# Patient Record
Sex: MALE | Race: WHITE | Employment: FULL TIME | URBAN - METROPOLITAN AREA
[De-identification: names, ages, dates, MRNs, and addresses within clinical notes are randomized per-mention and may not be internally consistent; named-entity substitution may affect disease eponyms.]

---

## 2017-07-26 ENCOUNTER — OFFICE VISIT (OUTPATIENT)
Dept: FAMILY MEDICINE CLINIC | Age: 32
End: 2017-07-26

## 2017-07-26 VITALS
BODY MASS INDEX: 46.13 KG/M2 | OXYGEN SATURATION: 97 % | HEART RATE: 97 BPM | SYSTOLIC BLOOD PRESSURE: 148 MMHG | WEIGHT: 315 LBS | TEMPERATURE: 98.6 F | DIASTOLIC BLOOD PRESSURE: 90 MMHG

## 2017-07-26 DIAGNOSIS — K21.9 GASTROESOPHAGEAL REFLUX DISEASE WITHOUT ESOPHAGITIS: ICD-10-CM

## 2017-07-26 DIAGNOSIS — R53.83 FATIGUE, UNSPECIFIED TYPE: ICD-10-CM

## 2017-07-26 DIAGNOSIS — Z13.6 SCREENING FOR CARDIOVASCULAR CONDITION: ICD-10-CM

## 2017-07-26 DIAGNOSIS — Z00.00 PHYSICAL EXAM, ANNUAL: Primary | ICD-10-CM

## 2017-07-26 DIAGNOSIS — E73.9 LACTOSE INTOLERANCE IN ADULT: ICD-10-CM

## 2017-07-26 DIAGNOSIS — G44.89 OTHER HEADACHE SYNDROME: ICD-10-CM

## 2017-07-26 DIAGNOSIS — J45.20 MILD INTERMITTENT ASTHMA WITHOUT COMPLICATION: ICD-10-CM

## 2017-07-26 DIAGNOSIS — E66.01 MORBID OBESITY DUE TO EXCESS CALORIES (HCC): ICD-10-CM

## 2017-07-26 PROCEDURE — 99395 PREV VISIT EST AGE 18-39: CPT | Performed by: FAMILY MEDICINE

## 2017-07-26 PROCEDURE — 36415 COLL VENOUS BLD VENIPUNCTURE: CPT | Performed by: FAMILY MEDICINE

## 2017-07-26 RX ORDER — ALBUTEROL SULFATE 90 UG/1
2 AEROSOL, METERED RESPIRATORY (INHALATION) EVERY 4 HOURS PRN
Qty: 1 INHALER | Refills: 5 | Status: SHIPPED | OUTPATIENT
Start: 2017-07-26 | End: 2017-12-21 | Stop reason: SDUPTHER

## 2017-07-26 RX ORDER — OMEPRAZOLE 40 MG/1
40 CAPSULE, DELAYED RELEASE ORAL DAILY
Qty: 30 CAPSULE | Refills: 5 | Status: SHIPPED | OUTPATIENT
Start: 2017-07-26 | End: 2018-01-03 | Stop reason: SDUPTHER

## 2017-07-27 LAB
A/G RATIO: 1.5 (CALC) (ref 0.8–2.6)
ALBUMIN SERPL-MCNC: 4.3 GM/DL (ref 3.5–5.2)
ALP BLD-CCNC: 81 U/L (ref 23–144)
ALT SERPL-CCNC: 66 U/L (ref 0–60)
AST SERPL-CCNC: 60 U/L (ref 0–46)
BASOPHILS ABSOLUTE: 0 K/MM3 (ref 0–0.3)
BASOPHILS RELATIVE PERCENT: 0.5 % (ref 0–2)
BILIRUB SERPL-MCNC: 0.3 MG/DL (ref 0–1.2)
BUN / CREAT RATIO: 13 (CALC) (ref 7–25)
BUN BLDV-MCNC: 13 MG/DL (ref 3–29)
CALCIUM SERPL-MCNC: 9.2 MG/DL (ref 8.5–10.5)
CHLORIDE BLD-SCNC: 101 MEQ/L (ref 96–110)
CHOLESTEROL, TOTAL: 160 MG/DL
CO2: 29 MEQ/L (ref 19–32)
COPY(IES) SENT TO:: NORMAL
CREAT SERPL-MCNC: 1 MG/DL
EOSINOPHILS ABSOLUTE: 0.2 K/MM3 (ref 0–0.6)
EOSINOPHILS RELATIVE PERCENT: 2 % (ref 0–7)
GFR SERPL CREATININE-BSD FRML MDRD: 101 ML/MIN/1.73M2
GLOBULIN: 2.9 GM/DL (CALC) (ref 1.9–3.6)
GLUCOSE BLD-MCNC: 113 MG/DL
HCT VFR BLD CALC: 46 % (ref 41–50)
HDLC SERPL-MCNC: 26 MG/DL
HEMOGLOBIN: 15 G/DL (ref 13.8–17.2)
LDL CHOLESTEROL: 60 MG/DL (CALC)
LEUKOCYTES, UA: 8.9 K/MM3 (ref 3.8–10.8)
LYMPHOCYTES ABSOLUTE: 2.7 K/MM3 (ref 0.9–4.1)
LYMPHOCYTES RELATIVE PERCENT: 30.7 % (ref 18–47)
MCH RBC QN AUTO: 30.8 PG (ref 27–33)
MCHC RBC AUTO-ENTMCNC: 32.7 G/DL (ref 32–36)
MCV RBC AUTO: 94.5 FL (ref 80–100)
MONOCYTES ABSOLUTE: 0.8 K/MM3 (ref 0.2–1.1)
MONOCYTES RELATIVE PERCENT: 9.4 % (ref 0–14)
NEUTROPHILS ABSOLUTE: 5.1 K/MM3 (ref 1.5–7.8)
PDW BLD-RTO: 13.6 % (ref 9–15)
PLATELET # BLD: 177 K/MM3 (ref 130–400)
POTASSIUM SERPL-SCNC: 4.4 MEQ/L (ref 3.4–5.3)
RBC # BLD: 4.87 M/MM3 (ref 4.4–5.8)
SEGMENTED NEUTROPHILS RELATIVE PERCENT: 57.4 % (ref 40–75)
SODIUM BLD-SCNC: 144 MEQ/L (ref 135–148)
TOTAL PROTEIN: 7.2 GM/DL (ref 6–8.3)
TRIGL SERPL-MCNC: 369 MG/DL
TSH SERPL DL<=0.05 MIU/L-ACNC: 4.14 MICRO IU/ML (ref 0.4–4)
VLDLC SERPL CALC-MCNC: 74 MG/DL (CALC) (ref 4–28)

## 2017-08-09 DIAGNOSIS — E03.9 ACQUIRED HYPOTHYROIDISM: Primary | ICD-10-CM

## 2017-08-09 RX ORDER — LEVOTHYROXINE SODIUM 0.05 MG/1
50 TABLET ORAL DAILY
Qty: 30 TABLET | Refills: 1 | Status: SHIPPED | OUTPATIENT
Start: 2017-08-09 | End: 2017-11-21 | Stop reason: SDUPTHER

## 2017-08-14 ENCOUNTER — TELEPHONE (OUTPATIENT)
Dept: FAMILY MEDICINE CLINIC | Age: 32
End: 2017-08-14

## 2017-08-14 ENCOUNTER — NURSE ONLY (OUTPATIENT)
Dept: FAMILY MEDICINE CLINIC | Age: 32
End: 2017-08-14

## 2017-08-14 VITALS — SYSTOLIC BLOOD PRESSURE: 152 MMHG | DIASTOLIC BLOOD PRESSURE: 94 MMHG

## 2017-08-14 DIAGNOSIS — I10 ESSENTIAL HYPERTENSION: Primary | ICD-10-CM

## 2017-08-14 RX ORDER — LISINOPRIL 20 MG/1
20 TABLET ORAL DAILY
Qty: 30 TABLET | Refills: 0 | Status: SHIPPED | OUTPATIENT
Start: 2017-08-14 | End: 2017-09-11 | Stop reason: SDUPTHER

## 2017-09-11 ENCOUNTER — OFFICE VISIT (OUTPATIENT)
Dept: FAMILY MEDICINE CLINIC | Age: 32
End: 2017-09-11

## 2017-09-11 VITALS
OXYGEN SATURATION: 96 % | DIASTOLIC BLOOD PRESSURE: 66 MMHG | WEIGHT: 315 LBS | BODY MASS INDEX: 45.4 KG/M2 | SYSTOLIC BLOOD PRESSURE: 118 MMHG | TEMPERATURE: 97.2 F | HEART RATE: 92 BPM

## 2017-09-11 DIAGNOSIS — I10 ESSENTIAL HYPERTENSION: ICD-10-CM

## 2017-09-11 DIAGNOSIS — E03.9 ACQUIRED HYPOTHYROIDISM: ICD-10-CM

## 2017-09-11 PROCEDURE — 36415 COLL VENOUS BLD VENIPUNCTURE: CPT | Performed by: FAMILY MEDICINE

## 2017-09-11 PROCEDURE — 99214 OFFICE O/P EST MOD 30 MIN: CPT | Performed by: FAMILY MEDICINE

## 2017-09-11 RX ORDER — LISINOPRIL 20 MG/1
20 TABLET ORAL DAILY
Qty: 30 TABLET | Refills: 5 | Status: SHIPPED | OUTPATIENT
Start: 2017-09-11 | End: 2018-02-12 | Stop reason: SDUPTHER

## 2017-09-12 LAB
BUN / CREAT RATIO: 18 (CALC) (ref 7–25)
BUN BLDV-MCNC: 16 MG/DL (ref 3–29)
CALCIUM SERPL-MCNC: 8.9 MG/DL (ref 8.5–10.5)
CHLORIDE BLD-SCNC: 102 MEQ/L (ref 96–110)
CO2: 32 MEQ/L (ref 19–32)
COPY(IES) SENT TO:: NORMAL
CREAT SERPL-MCNC: 0.9 MG/DL
GFR SERPL CREATININE-BSD FRML MDRD: 115 ML/MIN/1.73M2
GLUCOSE BLD-MCNC: 64 MG/DL
POTASSIUM SERPL-SCNC: 4.3 MEQ/L (ref 3.4–5.3)
SODIUM BLD-SCNC: 147 MEQ/L (ref 135–148)
TSH SERPL DL<=0.05 MIU/L-ACNC: 2.92 MICRO IU/ML (ref 0.4–4)

## 2018-02-08 ENCOUNTER — TELEPHONE (OUTPATIENT)
Dept: FAMILY MEDICINE CLINIC | Age: 33
End: 2018-02-08

## 2018-02-09 DIAGNOSIS — E73.9 LACTOSE INTOLERANCE IN ADULT: ICD-10-CM

## 2018-02-09 DIAGNOSIS — K21.9 GASTROESOPHAGEAL REFLUX DISEASE WITHOUT ESOPHAGITIS: ICD-10-CM

## 2018-02-09 RX ORDER — ALBUTEROL SULFATE 90 UG/1
2 AEROSOL, METERED RESPIRATORY (INHALATION) EVERY 4 HOURS PRN
Qty: 18 G | Refills: 0 | Status: SHIPPED | OUTPATIENT
Start: 2018-02-09 | End: 2018-02-12 | Stop reason: SDUPTHER

## 2018-02-09 RX ORDER — OMEPRAZOLE 40 MG/1
40 CAPSULE, DELAYED RELEASE ORAL DAILY
Qty: 30 CAPSULE | Refills: 0 | Status: SHIPPED | OUTPATIENT
Start: 2018-02-09 | End: 2018-02-12 | Stop reason: SDUPTHER

## 2018-02-12 ENCOUNTER — OFFICE VISIT (OUTPATIENT)
Dept: FAMILY MEDICINE CLINIC | Age: 33
End: 2018-02-12

## 2018-02-12 VITALS
TEMPERATURE: 97.2 F | OXYGEN SATURATION: 98 % | WEIGHT: 304 LBS | DIASTOLIC BLOOD PRESSURE: 80 MMHG | HEART RATE: 86 BPM | SYSTOLIC BLOOD PRESSURE: 126 MMHG | HEIGHT: 74 IN | BODY MASS INDEX: 39.01 KG/M2 | RESPIRATION RATE: 14 BRPM

## 2018-02-12 DIAGNOSIS — F41.9 ANXIETY: ICD-10-CM

## 2018-02-12 DIAGNOSIS — K21.9 GASTROESOPHAGEAL REFLUX DISEASE WITHOUT ESOPHAGITIS: ICD-10-CM

## 2018-02-12 DIAGNOSIS — E03.9 ACQUIRED HYPOTHYROIDISM: ICD-10-CM

## 2018-02-12 DIAGNOSIS — J45.40 MODERATE PERSISTENT ASTHMA WITHOUT COMPLICATION: ICD-10-CM

## 2018-02-12 DIAGNOSIS — I10 ESSENTIAL HYPERTENSION: Primary | ICD-10-CM

## 2018-02-12 PROCEDURE — 99214 OFFICE O/P EST MOD 30 MIN: CPT | Performed by: FAMILY MEDICINE

## 2018-02-12 PROCEDURE — G8510 SCR DEP NEG, NO PLAN REQD: HCPCS | Performed by: FAMILY MEDICINE

## 2018-02-12 PROCEDURE — 36415 COLL VENOUS BLD VENIPUNCTURE: CPT | Performed by: FAMILY MEDICINE

## 2018-02-12 RX ORDER — VARENICLINE TARTRATE 25 MG
KIT ORAL
Qty: 1 EACH | Refills: 0 | Status: SHIPPED | OUTPATIENT
Start: 2018-02-12 | End: 2018-07-26 | Stop reason: ALTCHOICE

## 2018-02-12 RX ORDER — OMEPRAZOLE 40 MG/1
40 CAPSULE, DELAYED RELEASE ORAL DAILY
Qty: 90 CAPSULE | Refills: 1 | Status: SHIPPED | OUTPATIENT
Start: 2018-02-12 | End: 2018-07-26 | Stop reason: SDUPTHER

## 2018-02-12 RX ORDER — LEVOTHYROXINE SODIUM 0.05 MG/1
TABLET ORAL
Qty: 90 TABLET | Refills: 1 | Status: SHIPPED | OUTPATIENT
Start: 2018-02-12 | End: 2018-03-12 | Stop reason: SDUPTHER

## 2018-02-12 RX ORDER — ALBUTEROL SULFATE 90 UG/1
2 AEROSOL, METERED RESPIRATORY (INHALATION) EVERY 4 HOURS PRN
Qty: 3 INHALER | Refills: 1 | Status: SHIPPED | OUTPATIENT
Start: 2018-02-12 | End: 2018-07-02 | Stop reason: SDUPTHER

## 2018-02-12 RX ORDER — LISINOPRIL 20 MG/1
20 TABLET ORAL DAILY
Qty: 90 TABLET | Refills: 1 | Status: SHIPPED | OUTPATIENT
Start: 2018-02-12 | End: 2018-07-26 | Stop reason: SDUPTHER

## 2018-02-12 RX ORDER — FLUOXETINE 10 MG/1
10 CAPSULE ORAL DAILY
Qty: 30 CAPSULE | Refills: 0 | Status: SHIPPED | OUTPATIENT
Start: 2018-02-12 | End: 2018-03-12 | Stop reason: SDUPTHER

## 2018-02-12 ASSESSMENT — PATIENT HEALTH QUESTIONNAIRE - PHQ9
SUM OF ALL RESPONSES TO PHQ9 QUESTIONS 1 & 2: 0
SUM OF ALL RESPONSES TO PHQ QUESTIONS 1-9: 0
2. FEELING DOWN, DEPRESSED OR HOPELESS: 0
1. LITTLE INTEREST OR PLEASURE IN DOING THINGS: 0

## 2018-02-12 NOTE — PATIENT INSTRUCTIONS
social groups, or volunteer to help others. Being alone sometimes makes things seem worse than they are. · Get at least 30 minutes of exercise on most days of the week to relieve stress. Walking is a good choice. You also may want to do other activities, such as running, swimming, cycling, or playing tennis or team sports. Relaxation techniques  Do relaxation exercises 10 to 20 minutes a day. You can play soothing, relaxing music while you do them, if you wish. · Tell others in your house that you are going to do your relaxation exercises. Ask them not to disturb you. · Find a comfortable place, away from all distractions and noise. · Lie down on your back, or sit with your back straight. · Focus on your breathing. Make it slow and steady. · Breathe in through your nose. Breathe out through either your nose or mouth. · Breathe deeply, filling up the area between your navel and your rib cage. Breathe so that your belly goes up and down. · Do not hold your breath. · Breathe like this for 5 to 10 minutes. Notice the feeling of calmness throughout your whole body. As you continue to breathe slowly and deeply, relax by doing the following for another 5 to 10 minutes:  · Tighten and relax each muscle group in your body. You can begin at your toes and work your way up to your head. · Imagine your muscle groups relaxing and becoming heavy. · Empty your mind of all thoughts. · Let yourself relax more and more deeply. · Become aware of the state of calmness that surrounds you. · When your relaxation time is over, you can bring yourself back to alertness by moving your fingers and toes and then your hands and feet and then stretching and moving your entire body. Sometimes people fall asleep during relaxation, but they usually wake up shortly afterward. · Always give yourself time to return to full alertness before you drive a car or do anything that might cause an accident if you are not fully alert.  Never play a relaxation tape while you drive a car. When should you call for help? Call 911 anytime you think you may need emergency care. For example, call if:  ? · You feel you cannot stop from hurting yourself or someone else. ? Keep the numbers for these national suicide hotlines: 3-438-893-TALK (4-199.901.1862) and 1-448-CLNKUJC (7-773.685.1377). If you or someone you know talks about suicide or feeling hopeless, get help right away. ? Watch closely for changes in your health, and be sure to contact your doctor if:  ? · You have anxiety or fear that affects your life. ? · You have symptoms of anxiety that are new or different from those you had before. Where can you learn more? Go to https://Horizon Studiospepiceweb.Feebbo. org and sign in to your MyShape account. Enter P754 in the [employer] box to learn more about \"Anxiety Disorder: Care Instructions. \"     If you do not have an account, please click on the \"Sign Up Now\" link. Current as of: May 12, 2017  Content Version: 11.5  © 5612-4484 Healthwise, Incorporated. Care instructions adapted under license by [facility] ([facility]). If you have questions about a medical condition or this instruction, always ask your healthcare professional. [address] any warranty or liability for your use of this information.

## 2018-02-13 LAB
A/G RATIO: 1.5 (CALC) (ref 0.8–2.6)
ALBUMIN SERPL-MCNC: 4.3 GM/DL (ref 3.5–5.2)
ALP BLD-CCNC: 87 U/L (ref 23–144)
ALT SERPL-CCNC: 34 U/L (ref 0–60)
AST SERPL-CCNC: 43 U/L (ref 0–46)
BILIRUB SERPL-MCNC: 0.4 MG/DL (ref 0–1.2)
BUN / CREAT RATIO: 20 (CALC) (ref 7–25)
BUN BLDV-MCNC: 18 MG/DL (ref 3–29)
CALCIUM SERPL-MCNC: 9 MG/DL (ref 8.5–10.5)
CHLORIDE BLD-SCNC: 100 MEQ/L (ref 96–110)
CO2: 28 MEQ/L (ref 19–32)
COPY(IES) SENT TO:: NORMAL
CREAT SERPL-MCNC: 0.9 MG/DL
GFR SERPL CREATININE-BSD FRML MDRD: 114 ML/MIN/1.73M2
GLOBULIN: 2.9 GM/DL (CALC) (ref 1.9–3.6)
GLUCOSE BLD-MCNC: 100 MG/DL
POTASSIUM SERPL-SCNC: 4.1 MEQ/L (ref 3.4–5.3)
SODIUM BLD-SCNC: 142 MEQ/L (ref 135–148)
TOTAL PROTEIN: 7.2 GM/DL (ref 6–8.3)
TSH SERPL DL<=0.05 MIU/L-ACNC: 2.64 MICRO IU/ML (ref 0.4–4)

## 2018-03-12 ENCOUNTER — OFFICE VISIT (OUTPATIENT)
Dept: FAMILY MEDICINE CLINIC | Age: 33
End: 2018-03-12

## 2018-03-12 VITALS
DIASTOLIC BLOOD PRESSURE: 80 MMHG | BODY MASS INDEX: 38.94 KG/M2 | SYSTOLIC BLOOD PRESSURE: 118 MMHG | OXYGEN SATURATION: 96 % | WEIGHT: 303.4 LBS | TEMPERATURE: 97.8 F | HEIGHT: 74 IN | HEART RATE: 86 BPM

## 2018-03-12 DIAGNOSIS — E03.9 ACQUIRED HYPOTHYROIDISM: ICD-10-CM

## 2018-03-12 DIAGNOSIS — F41.9 ANXIETY: ICD-10-CM

## 2018-03-12 PROCEDURE — 99213 OFFICE O/P EST LOW 20 MIN: CPT | Performed by: FAMILY MEDICINE

## 2018-03-12 RX ORDER — LEVOTHYROXINE SODIUM 0.07 MG/1
75 TABLET ORAL DAILY
Qty: 90 TABLET | Refills: 1 | Status: SHIPPED | OUTPATIENT
Start: 2018-03-12 | End: 2018-07-26 | Stop reason: SDUPTHER

## 2018-03-12 RX ORDER — FLUOXETINE HYDROCHLORIDE 20 MG/1
20 CAPSULE ORAL DAILY
Qty: 90 CAPSULE | Refills: 1 | Status: SHIPPED | OUTPATIENT
Start: 2018-03-12 | End: 2018-07-06 | Stop reason: ALTCHOICE

## 2018-03-12 NOTE — PATIENT INSTRUCTIONS
social groups, or volunteer to help others. Being alone sometimes makes things seem worse than they are. · Get at least 30 minutes of exercise on most days of the week to relieve stress. Walking is a good choice. You also may want to do other activities, such as running, swimming, cycling, or playing tennis or team sports. Relaxation techniques  Do relaxation exercises 10 to 20 minutes a day. You can play soothing, relaxing music while you do them, if you wish. · Tell others in your house that you are going to do your relaxation exercises. Ask them not to disturb you. · Find a comfortable place, away from all distractions and noise. · Lie down on your back, or sit with your back straight. · Focus on your breathing. Make it slow and steady. · Breathe in through your nose. Breathe out through either your nose or mouth. · Breathe deeply, filling up the area between your navel and your rib cage. Breathe so that your belly goes up and down. · Do not hold your breath. · Breathe like this for 5 to 10 minutes. Notice the feeling of calmness throughout your whole body. As you continue to breathe slowly and deeply, relax by doing the following for another 5 to 10 minutes:  · Tighten and relax each muscle group in your body. You can begin at your toes and work your way up to your head. · Imagine your muscle groups relaxing and becoming heavy. · Empty your mind of all thoughts. · Let yourself relax more and more deeply. · Become aware of the state of calmness that surrounds you. · When your relaxation time is over, you can bring yourself back to alertness by moving your fingers and toes and then your hands and feet and then stretching and moving your entire body. Sometimes people fall asleep during relaxation, but they usually wake up shortly afterward. · Always give yourself time to return to full alertness before you drive a car or do anything that might cause an accident if you are not fully alert.  Never play a relaxation tape while you drive a car. When should you call for help? Call 911 anytime you think you may need emergency care. For example, call if:  ? · You feel you cannot stop from hurting yourself or someone else. ? Keep the numbers for these national suicide hotlines: 7-652-531-TALK (5-289.290.4717) and 5-125-SPISZRE (3-752.648.5150). If you or someone you know talks about suicide or feeling hopeless, get help right away. ? Watch closely for changes in your health, and be sure to contact your doctor if:  ? · You have anxiety or fear that affects your life. ? · You have symptoms of anxiety that are new or different from those you had before. Where can you learn more? Go to https://OpenROVpejayU-NOTEeb.Spectropath. org and sign in to your PacketFront account. Enter P754 in the [employer] box to learn more about \"Anxiety Disorder: Care Instructions. \"     If you do not have an account, please click on the \"Sign Up Now\" link. Current as of: May 12, 2017  Content Version: 11.5  © 5917-7699 Healthwise, Incorporated. Care instructions adapted under license by [facility] [place] ([facility]). If you have questions about a medical condition or this instruction, always ask your healthcare professional. [address] any warranty or liability for your use of this information.

## 2018-03-12 NOTE — PROGRESS NOTES
Subjective:       Alejandra Abernathy is a 29 y.o. male who presents for follow up of anxiety disorder. Overall, symptoms are improving on fluoxetine 10 mg.  Current symptoms: feelings of losing control, irritable. He denies current suicidal and homicidal ideation. He complains of the following side effects from the treatment: none. Patient's medications, allergies, past medical, surgical, social and family histories were reviewed and updated as appropriate. Objective:      /80 (Site: Left Arm, Position: Sitting, Cuff Size: Large Adult)   Pulse 86   Temp 97.8 °F (36.6 °C) (Oral)   Ht 6' 2\" (1.88 m)   Wt (!) 303 lb 6.4 oz (137.6 kg)   SpO2 96%   BMI 38.95 kg/m²    General:  alert, appears stated age and cooperative. No shake or tremor observed. Neck: Thyroid not palpable, not enlarged, no nodules detected. Chest: clear with no wheezes or rales. No retractions, or use of accessory muscles noted. Cardiovascular: PMI is not displaced, and no thrill noted. Regular rate and rhythm with no rub, murmur or gallop. No peripheral edema. Pedal pulses are normal.    Affect & Behavior:   agitation       Assessment:      Anxiety Disorder - improving      Plan:   Increase Prozac to 20 mg daily   Recommended counseling. Reviewed concept of anxiety as biochemical imbalance of neurotransmitters and rationale for treatment. Instructed patient to contact office or on-call physician promptly should condition worsen or any new symptoms appear and provided on-call telephone numbers. IF THE PATIENT HAS ANY SUICIDAL OR HOMICIDAL IDEATIONS, CALL THE OFFICE, DISCUSS WITH A SUPPORT MEMBER, OR GO TO THE ER IMMEDIATELY. Patient was agreeable with this plan. Follow up: 5 months.

## 2018-03-26 ENCOUNTER — TELEPHONE (OUTPATIENT)
Dept: FAMILY MEDICINE CLINIC | Age: 33
End: 2018-03-26

## 2018-04-11 ENCOUNTER — TELEPHONE (OUTPATIENT)
Dept: FAMILY MEDICINE CLINIC | Age: 33
End: 2018-04-11

## 2018-06-25 ENCOUNTER — HOSPITAL ENCOUNTER (OUTPATIENT)
Dept: GENERAL RADIOLOGY | Age: 33
Discharge: HOME | End: 2018-06-25
Attending: PODIATRIST | Admitting: PODIATRIST

## 2018-06-25 DIAGNOSIS — M79.671 RIGHT FOOT PAIN: ICD-10-CM

## 2018-06-25 DIAGNOSIS — M21.6X2 ACQUIRED CAVOVARUS FOOT DEFORMITY, LEFT: ICD-10-CM

## 2018-06-25 DIAGNOSIS — M21.6X1 ACQUIRED EXTERNAL ROTATION OF FOOT, RIGHT: ICD-10-CM

## 2018-07-02 DIAGNOSIS — J45.40 MODERATE PERSISTENT ASTHMA WITHOUT COMPLICATION: ICD-10-CM

## 2018-07-05 RX ORDER — ALBUTEROL SULFATE 90 UG/1
2 AEROSOL, METERED RESPIRATORY (INHALATION) EVERY 4 HOURS PRN
Qty: 1 INHALER | Refills: 0 | Status: SHIPPED | OUTPATIENT
Start: 2018-07-05 | End: 2018-07-06 | Stop reason: SDUPTHER

## 2018-07-05 NOTE — TELEPHONE ENCOUNTER
Patient needs an office visit to discuss controller medications. He needs better control of his asthma.

## 2018-07-06 ENCOUNTER — OFFICE VISIT (OUTPATIENT)
Dept: FAMILY MEDICINE CLINIC | Age: 33
End: 2018-07-06

## 2018-07-06 VITALS
BODY MASS INDEX: 41.03 KG/M2 | SYSTOLIC BLOOD PRESSURE: 124 MMHG | OXYGEN SATURATION: 97 % | TEMPERATURE: 96.8 F | WEIGHT: 315 LBS | HEART RATE: 63 BPM | DIASTOLIC BLOOD PRESSURE: 82 MMHG

## 2018-07-06 DIAGNOSIS — F41.9 ANXIETY: Primary | ICD-10-CM

## 2018-07-06 DIAGNOSIS — J45.40 MODERATE PERSISTENT ASTHMA WITHOUT COMPLICATION: ICD-10-CM

## 2018-07-06 PROCEDURE — 99214 OFFICE O/P EST MOD 30 MIN: CPT | Performed by: FAMILY MEDICINE

## 2018-07-06 PROCEDURE — G8510 SCR DEP NEG, NO PLAN REQD: HCPCS | Performed by: FAMILY MEDICINE

## 2018-07-06 RX ORDER — FLUVOXAMINE MALEATE 100 MG/1
100 CAPSULE, EXTENDED RELEASE ORAL NIGHTLY
Qty: 30 CAPSULE | Refills: 5 | Status: SHIPPED | OUTPATIENT
Start: 2018-07-06 | End: 2022-05-12 | Stop reason: ALTCHOICE

## 2018-07-06 RX ORDER — ALBUTEROL SULFATE 90 UG/1
2 AEROSOL, METERED RESPIRATORY (INHALATION) EVERY 4 HOURS PRN
Qty: 1 INHALER | Refills: 5 | Status: SHIPPED | OUTPATIENT
Start: 2018-07-06 | End: 2022-05-12 | Stop reason: SDUPTHER

## 2018-07-06 RX ORDER — MELOXICAM 15 MG/1
TABLET ORAL
Refills: 2 | COMMUNITY
Start: 2018-06-07 | End: 2022-05-12 | Stop reason: ALTCHOICE

## 2018-07-06 ASSESSMENT — PATIENT HEALTH QUESTIONNAIRE - PHQ9
2. FEELING DOWN, DEPRESSED OR HOPELESS: 0
SUM OF ALL RESPONSES TO PHQ QUESTIONS 1-9: 0
1. LITTLE INTEREST OR PLEASURE IN DOING THINGS: 0
SUM OF ALL RESPONSES TO PHQ9 QUESTIONS 1 & 2: 0

## 2018-07-06 NOTE — PROGRESS NOTES
Subjective:       Ashley Aguilar is a 29 y.o. male who presents for follow up of anxiety disorder. Overall, symptoms are improving on fluoxetine 10 mg.  Current symptoms: feelings of losing control, irritable. He denies current suicidal and homicidal ideation. He complains of the following side effects from the treatment: makes him feel disconnected. Asthma: Patient presents for asthma follow-up. He is not currently in exacerbation. Symptoms currently include dyspnea and occur more than once daily. Observed precipitants include emotional upset. Current limitations in activity from asthma: none. Number of days of school or work missed in the last month: 0. Frequency of use of quick-relief meds: several times per day. Patient's medications, allergies, past medical, surgical, social and family histories were reviewed and updated as appropriate. Objective:      /82 (Site: Left Arm, Position: Sitting, Cuff Size: Medium Adult)   Pulse 63   Temp 96.8 °F (36 °C) (Temporal)   Wt (!) 319 lb 9.6 oz (145 kg)   SpO2 97%   BMI 41.03 kg/m²    General:  alert, appears stated age and cooperative. No shake or tremor observed. Neck: Thyroid not palpable, not enlarged, no nodules detected. Chest: clear with no wheezes or rales. No retractions, or use of accessory muscles noted. Cardiovascular: PMI is not displaced, and no thrill noted. Regular rate and rhythm with no rub, murmur or gallop. No peripheral edema. Pedal pulses are normal.    Affect & Behavior:   agitation       Assessment:      Anxiety Disorder - unchanged  Asthma - moderate persistant       Plan:   Stop Prozac and start Luvox. Start Asmanex as controller medication. Recommended counseling. Reviewed concept of anxiety as biochemical imbalance of neurotransmitters and rationale for treatment.   Instructed patient to contact office or on-call physician promptly should condition worsen or any new symptoms appear and provided on-call

## 2018-07-26 ENCOUNTER — OFFICE VISIT (OUTPATIENT)
Dept: FAMILY MEDICINE CLINIC | Age: 33
End: 2018-07-26

## 2018-07-26 VITALS
WEIGHT: 310.2 LBS | DIASTOLIC BLOOD PRESSURE: 78 MMHG | SYSTOLIC BLOOD PRESSURE: 120 MMHG | HEART RATE: 76 BPM | OXYGEN SATURATION: 98 % | TEMPERATURE: 97.6 F | BODY MASS INDEX: 39.83 KG/M2

## 2018-07-26 DIAGNOSIS — H60.00 FURUNCLE OF EAR: Primary | ICD-10-CM

## 2018-07-26 DIAGNOSIS — E03.9 ACQUIRED HYPOTHYROIDISM: ICD-10-CM

## 2018-07-26 DIAGNOSIS — K21.9 GASTROESOPHAGEAL REFLUX DISEASE WITHOUT ESOPHAGITIS: ICD-10-CM

## 2018-07-26 DIAGNOSIS — I10 ESSENTIAL HYPERTENSION: ICD-10-CM

## 2018-07-26 LAB
ANION GAP SERPL CALCULATED.3IONS-SCNC: 14 MMOL/L (ref 3–16)
BUN BLDV-MCNC: 14 MG/DL (ref 7–20)
CALCIUM SERPL-MCNC: 9 MG/DL (ref 8.3–10.6)
CHLORIDE BLD-SCNC: 103 MMOL/L (ref 99–110)
CO2: 27 MMOL/L (ref 21–32)
CREAT SERPL-MCNC: 1 MG/DL (ref 0.9–1.3)
GFR AFRICAN AMERICAN: >60
GFR NON-AFRICAN AMERICAN: >60
GLUCOSE BLD-MCNC: 89 MG/DL (ref 70–99)
POTASSIUM SERPL-SCNC: 4.5 MMOL/L (ref 3.5–5.1)
SODIUM BLD-SCNC: 144 MMOL/L (ref 136–145)

## 2018-07-26 PROCEDURE — 36415 COLL VENOUS BLD VENIPUNCTURE: CPT | Performed by: FAMILY MEDICINE

## 2018-07-26 PROCEDURE — 99214 OFFICE O/P EST MOD 30 MIN: CPT | Performed by: FAMILY MEDICINE

## 2018-07-26 RX ORDER — LEVOTHYROXINE SODIUM 0.07 MG/1
75 TABLET ORAL DAILY
Qty: 90 TABLET | Refills: 1 | Status: SHIPPED | OUTPATIENT
Start: 2018-07-26 | End: 2022-05-12 | Stop reason: ALTCHOICE

## 2018-07-26 RX ORDER — LISINOPRIL 20 MG/1
20 TABLET ORAL DAILY
Qty: 90 TABLET | Refills: 1 | Status: SHIPPED | OUTPATIENT
Start: 2018-07-26 | End: 2022-05-13 | Stop reason: SDUPTHER

## 2018-07-26 RX ORDER — OMEPRAZOLE 40 MG/1
40 CAPSULE, DELAYED RELEASE ORAL DAILY
Qty: 90 CAPSULE | Refills: 1 | Status: SHIPPED | OUTPATIENT
Start: 2018-07-26 | End: 2022-05-12 | Stop reason: ALTCHOICE

## 2018-07-26 NOTE — PROGRESS NOTES
Subjective:      Barbie Gann is a 29 y.o. male who presents for evaluation of hypertension. He indicates that he is feeling well and denies any symptoms referable to his elevated blood pressure. Specifically denies chest pain, palpitations, dyspnea, orthopnea, PND or peripheral edema. Current medication regimen is as listed below. Patient denies any side effects of medication. Hypothyroidism: Patient complains of hypothyroidism. Symptoms include none. Patient denies change in energy level, diarrhea, heat / cold intolerance, nervousness, palpitations and weight changes. Onset of symptoms was several years ago. Symptoms have been well-controlled. GERD: Elvin complains of heartburn. This has been associated with no other symptoms. He denies belching and chest pain. Symptoms have been present for several years. He denies dysphagia. He has not lost weight. He denies melena, hematochezia, hematemesis, and coffee ground emesis. Medical therapy in the past has included proton pump inhibitors. Lesion on right outer ear. Had a sore that he \" popped. \"  It was infected, but he treated with neosporin. No fever.     Current Outpatient Prescriptions   Medication Sig Dispense Refill    omeprazole (PRILOSEC) 40 MG delayed release capsule Take 1 capsule by mouth daily 90 capsule 1    lisinopril (PRINIVIL;ZESTRIL) 20 MG tablet Take 1 tablet by mouth daily 90 tablet 1    levothyroxine (SYNTHROID) 75 MCG tablet Take 1 tablet by mouth daily TAKE ONE TABLET BY MOUTH ONE TIME A DAY 90 tablet 1    meloxicam (MOBIC) 15 MG tablet TAKE 1 TABLET BY MOUTH DAILY  2    fluvoxaMINE (LUVOX CR) 100 MG CP24 extended release capsule Take 100 mg by mouth nightly 30 capsule 5    mometasone (ASMANEX) 220 MCG/INH inhaler Inhale 2 puffs into the lungs daily 1 Inhaler 5    albuterol sulfate HFA (VENTOLIN HFA) 108 (90 Base) MCG/ACT inhaler Inhale 2 puffs into the lungs every 4 hours as needed for Wheezing 1 Inhaler 5     No current

## 2021-05-22 ENCOUNTER — HOSPITAL ENCOUNTER (OUTPATIENT)
Dept: SLEEP CENTER | Age: 36
Discharge: HOME | End: 2021-05-22

## 2021-05-22 VITALS — BODY MASS INDEX: 39.17 KG/M2 | HEIGHT: 75 IN | WEIGHT: 315 LBS

## 2021-05-22 DIAGNOSIS — G47.33 OSA (OBSTRUCTIVE SLEEP APNEA): ICD-10-CM

## 2021-05-22 PROCEDURE — 95810 POLYSOM 6/> YRS 4/> PARAM: CPT

## 2021-05-22 ASSESSMENT — SLEEP AND FATIGUE QUESTIONNAIRES
HOW LIKELY ARE YOU TO NOD OFF OR FALL ASLEEP IN A CAR, WHILE STOPPED FOR A FEW MINUTES IN TRAFFIC: 0
HOW LIKELY ARE YOU TO NOD OFF OR FALL ASLEEP WHILE SITTING AND READING: 0
HOW LIKELY ARE YOU TO NOD OFF OR FALL ASLEEP WHILE WATCHING TV: 0
HOW LIKELY ARE YOU TO NOD OFF OR FALL ASLEEP WHILE LYING DOWN TO REST IN THE AFTERNOON WHEN CIRCUMSTANCES PERMIT: 3
HOW LIKELY ARE YOU TO NOD OFF OR FALL ASLEEP WHILE SITTING AND TALKING TO SOMEONE: 0

## 2021-05-23 NOTE — PROGRESS NOTES
5/23/2021  sleep study  for Umm Anguiano  1985 is complete. Results are pending physician review.     Electronically signed by Carlos Enrique Gann on 5/23/2021 at 7:16 AM

## 2021-05-27 LAB — STATUS: NORMAL

## 2021-12-14 ENCOUNTER — HOSPITAL ENCOUNTER (EMERGENCY)
Age: 36
Discharge: LEFT BEFORE DISCHARGE | End: 2021-12-14

## 2021-12-14 NOTE — ED NOTES
RN enters lobby and patient not present.  Per registration, patient left the hospital.     Saintclair Maxon, RN  12/14/21 [id number]

## 2022-05-12 ENCOUNTER — OFFICE VISIT (OUTPATIENT)
Dept: INTERNAL MEDICINE CLINIC | Age: 37
End: 2022-05-12
Payer: COMMERCIAL

## 2022-05-12 VITALS
OXYGEN SATURATION: 95 % | DIASTOLIC BLOOD PRESSURE: 90 MMHG | WEIGHT: 315 LBS | HEART RATE: 90 BPM | BODY MASS INDEX: 43.87 KG/M2 | SYSTOLIC BLOOD PRESSURE: 144 MMHG

## 2022-05-12 DIAGNOSIS — R73.09 ELEVATED GLUCOSE: ICD-10-CM

## 2022-05-12 DIAGNOSIS — Z00.00 GENERAL MEDICAL EXAM: ICD-10-CM

## 2022-05-12 DIAGNOSIS — I10 ESSENTIAL HYPERTENSION: Primary | ICD-10-CM

## 2022-05-12 DIAGNOSIS — E78.5 HYPERLIPIDEMIA, UNSPECIFIED HYPERLIPIDEMIA TYPE: ICD-10-CM

## 2022-05-12 DIAGNOSIS — E66.01 CLASS 3 SEVERE OBESITY DUE TO EXCESS CALORIES WITHOUT SERIOUS COMORBIDITY WITH BODY MASS INDEX (BMI) OF 40.0 TO 44.9 IN ADULT (HCC): ICD-10-CM

## 2022-05-12 DIAGNOSIS — E03.9 ACQUIRED HYPOTHYROIDISM: ICD-10-CM

## 2022-05-12 DIAGNOSIS — J45.40 MODERATE PERSISTENT ASTHMA WITHOUT COMPLICATION: ICD-10-CM

## 2022-05-12 DIAGNOSIS — G47.33 OBSTRUCTIVE SLEEP APNEA: ICD-10-CM

## 2022-05-12 DIAGNOSIS — F41.9 ANXIETY: ICD-10-CM

## 2022-05-12 PROBLEM — E66.813 CLASS 3 SEVERE OBESITY DUE TO EXCESS CALORIES WITHOUT SERIOUS COMORBIDITY IN ADULT: Status: ACTIVE | Noted: 2022-05-12

## 2022-05-12 PROCEDURE — 99385 PREV VISIT NEW AGE 18-39: CPT | Performed by: FAMILY MEDICINE

## 2022-05-12 RX ORDER — ALBUTEROL SULFATE 90 UG/1
2 AEROSOL, METERED RESPIRATORY (INHALATION) EVERY 4 HOURS PRN
Qty: 1 EACH | Refills: 3 | Status: SHIPPED | OUTPATIENT
Start: 2022-05-12

## 2022-05-12 RX ORDER — FLUTICASONE PROPIONATE AND SALMETEROL 100; 50 UG/1; UG/1
1 POWDER RESPIRATORY (INHALATION) EVERY 12 HOURS
Qty: 1 EACH | Refills: 1 | Status: SHIPPED | OUTPATIENT
Start: 2022-05-12 | End: 2022-05-13 | Stop reason: SDUPTHER

## 2022-05-12 SDOH — ECONOMIC STABILITY: FOOD INSECURITY: WITHIN THE PAST 12 MONTHS, THE FOOD YOU BOUGHT JUST DIDN'T LAST AND YOU DIDN'T HAVE MONEY TO GET MORE.: NEVER TRUE

## 2022-05-12 SDOH — ECONOMIC STABILITY: FOOD INSECURITY: WITHIN THE PAST 12 MONTHS, YOU WORRIED THAT YOUR FOOD WOULD RUN OUT BEFORE YOU GOT MONEY TO BUY MORE.: NEVER TRUE

## 2022-05-12 ASSESSMENT — ENCOUNTER SYMPTOMS
CHEST TIGHTNESS: 0
COLOR CHANGE: 0
DIARRHEA: 0
SORE THROAT: 0
SHORTNESS OF BREATH: 1
ABDOMINAL PAIN: 0
CONSTIPATION: 0
VOMITING: 0
WHEEZING: 1

## 2022-05-12 ASSESSMENT — PATIENT HEALTH QUESTIONNAIRE - PHQ9
1. LITTLE INTEREST OR PLEASURE IN DOING THINGS: 0
SUM OF ALL RESPONSES TO PHQ QUESTIONS 1-9: 0
2. FEELING DOWN, DEPRESSED OR HOPELESS: 0
SUM OF ALL RESPONSES TO PHQ QUESTIONS 1-9: 0
SUM OF ALL RESPONSES TO PHQ9 QUESTIONS 1 & 2: 0

## 2022-05-12 ASSESSMENT — SOCIAL DETERMINANTS OF HEALTH (SDOH): HOW HARD IS IT FOR YOU TO PAY FOR THE VERY BASICS LIKE FOOD, HOUSING, MEDICAL CARE, AND HEATING?: NOT HARD AT ALL

## 2022-05-12 NOTE — PATIENT INSTRUCTIONS
Start monitoring your blood pressure at home. Your readings should be <140/90. Keep a log and bring it to your next appointment.

## 2022-05-12 NOTE — PROGRESS NOTES
Subjective:      Chief Complaint   Patient presents with    New Patient    Asthma    Anxiety       HPI:  Charles Russell is a 31 y.o. male who presents today to establish care with new provider and for management of chronic medical conditions as below. Patient used to see Dr. Lashaun Pineda, then was seeing Dr. Rosalina Pollock until last year, but has not been seen in several months. HTN:  Is taking lisinopril 30mg daily. Does not check BP's at home. States his BP's are always elevated in the office as he gets anxious, but are improved at home. Drinks about 1L of regular soda a day (sometimes more). States father  of heart attack at 40, but was also a heroin addict. Asthma:  Quit smoking a few months ago- had smoked for over 20 years. States he is using albuterol about once an hour. Has never been on maintenance inhaler. Does not see a pulmonologist.  Denies ever being diagnosed with COPD. Anxiety:  Taking zoloft 50mg daily, has been taking for about a year. States symptoms are well controlled on current dose. Still has issues with social anxiety, but states it does not affect his functioning. Hypothyroidism:  States he used to take synthroid, but has not been taking for the past few years. Also had a sleep study last year and was diagnosed with CHRISTIAN. Did receive CPAP machine, but states that once he quit smoking a few months ago, his sleep symptoms completely resolved. Does not see any specialists.         Past Medical History:   Diagnosis Date    Anxiety     \"About Surgery\"    Asthma     \"As a child, no problems since then\"    Chronic back pain     Foot callus     \"Bilateral\"    Headache(784.0)     \"Use to have intense headaches\"    Meningitis spinal  or     Phobia     \"Phobia Of Needles\"    Shortness of breath on exertion         Past Surgical History:   Procedure Laterality Date    KNEE ARTHROSCOPY  [id number]    rt knee lateral menisectomy    OTHER SURGICAL HISTORY      \"Removal Inhaled  Paper Clips From Tonsils\"       Social History     Tobacco Use    Smoking status: Former Smoker     Packs/day: 1.00     Years: 10.00     Pack years: 10.00     Types: Cigarettes     Quit date: 2022     Years since quittin.1    Smokeless tobacco: Former User     Quit date: 6/15/2002    Tobacco comment: \"Occ chewing tobacco when quit in \"   Substance Use Topics    Alcohol use: Yes     Comment: \"Occ\"        Review of Systems   Constitutional: Negative for activity change, appetite change, chills, fever and unexpected weight change. HENT: Negative for congestion and sore throat. Respiratory: Positive for shortness of breath and wheezing. Negative for chest tightness. Cardiovascular: Negative for chest pain and palpitations. Gastrointestinal: Negative for abdominal pain, constipation, diarrhea and vomiting. Genitourinary: Negative for dysuria. Skin: Negative for color change. Neurological: Negative for dizziness and light-headedness. Psychiatric/Behavioral: Negative for dysphoric mood. The patient is not nervous/anxious. Prior to Visit Medications    Medication Sig Taking?  Authorizing Provider   sertraline (ZOLOFT) 50 MG tablet Take 50 mg by mouth daily  Yes Historical Provider, MD   lisinopril (PRINIVIL;ZESTRIL) 20 MG tablet Take 1 tablet by mouth daily Yes Aman Rendon MD   albuterol sulfate HFA (VENTOLIN HFA) 108 (90 Base) MCG/ACT inhaler Inhale 2 puffs into the lungs every 4 hours as needed for Wheezing Yes Aman Rendon MD   omeprazole (PRILOSEC) 40 MG delayed release capsule Take 1 capsule by mouth daily  Patient not taking: Reported on 2022  Aman Rendon MD   levothyroxine (SYNTHROID) 75 MCG tablet Take 1 tablet by mouth daily TAKE ONE TABLET BY MOUTH ONE TIME A DAY  Patient not taking: Reported on 2022  Aman Rendon MD   meloxicam (MOBIC) 15 MG tablet TAKE 1 TABLET BY MOUTH DAILY  Patient not taking: Reported on 5/12/2022  Historical Provider, MD   fluvoxaMINE (LUVOX CR) 100 MG CP24 extended release capsule Take 100 mg by mouth nightly  Patient not taking: Reported on 5/12/2022  Linda Venegas MD          Objective:      BP (!) 144/90   Pulse 90   Wt (!) 351 lb (159.2 kg)   SpO2 95%   BMI 43.87 kg/m²      Physical Exam  Vitals and nursing note reviewed. Constitutional:       General: He is not in acute distress. Appearance: Normal appearance. He is obese. He is not ill-appearing or toxic-appearing. HENT:      Head: Normocephalic and atraumatic. Right Ear: External ear normal.      Left Ear: External ear normal.      Nose: Nose normal.      Mouth/Throat:      Pharynx: Oropharynx is clear. Eyes:      General: No scleral icterus. Right eye: No discharge. Left eye: No discharge. Extraocular Movements: Extraocular movements intact. Conjunctiva/sclera: Conjunctivae normal.   Cardiovascular:      Rate and Rhythm: Normal rate and regular rhythm. Heart sounds: Normal heart sounds. Pulmonary:      Effort: Pulmonary effort is normal.      Breath sounds: Normal breath sounds. No wheezing or rales. Musculoskeletal:         General: No deformity. Cervical back: Normal range of motion and neck supple. No rigidity. Skin:     General: Skin is warm and dry. Findings: No rash. Neurological:      General: No focal deficit present. Mental Status: He is alert. Mental status is at baseline. Motor: No weakness. Psychiatric:         Mood and Affect: Mood normal.         Behavior: Behavior normal.            Assessment / Plan:      1. General medical exam  Patient presenting to establish care. Due for annual labs. - CBC with Auto Differential; Future  - Comprehensive Metabolic Panel; Future  - Hemoglobin A1C; Future  - Lipid Panel; Future    2. Essential hypertension  BP elevated today, but patient reports white coat hypertension.   Discussed importance of monitoring BP's home to verify that they normalize outside the office. Instructed to keep BP log and bring to appointment in a few weeks. If still elevated, will plan on increasing lisinopril dose. - CBC with Auto Differential; Future  - Comprehensive Metabolic Panel; Future  - Hemoglobin A1C; Future    3. Acquired hypothyroidism  Patient reports he self discontinued synthroid years ago. Will recheck labs. - TSH; Future    4. Hyperlipidemia, unspecified hyperlipidemia type  Discussed lifestyle modifications, due for labs. - Hemoglobin A1C; Future  - Lipid Panel; Future    5. Moderate persistent asthma without complication  Poorly controlled and currently treating inappropriately. Will start ICS/LABA daily, can use albuterol as needed. Commended on smoking cessation. Will follow up in a few weeks- if still using albuterol multiple times a day, will increase advair dose. - fluticasone-salmeterol (ADVAIR DISKUS) 100-50 MCG/ACT AEPB diskus inhaler; Inhale 1 puff into the lungs every 12 hours  Dispense: 1 each; Refill: 1  - albuterol sulfate HFA (VENTOLIN HFA) 108 (90 Base) MCG/ACT inhaler; Inhale 2 puffs into the lungs every 4 hours as needed for Wheezing or Shortness of Breath  Dispense: 1 each; Refill: 3    6. Anxiety  Well controlled on current dose of zoloft- will continue. - sertraline (ZOLOFT) 50 MG tablet; Take 50 mg by mouth daily     7. Class 3 severe obesity due to excess calories without serious comorbidity with body mass index (BMI) of 40.0 to 44.9 in adult [facility])  Discussed importance of weight loss on comorbidities. Offered referral to weight loss clinic, will revisit at follow up. - Lipid Panel; Future  - TSH; Future    8. Elevated glucose  Will screen for DM. Patient currently drinking at least 1L regular soda daily- discussed importance of cutting down on sugar intake. - Hemoglobin A1C; Future             I have spent 44 minutes on this patient encounter.      Patient voiced understanding and agreement with plan. All questions/concerns were addressed, risks/side effects of medications were reviewed. Return precautions and after visit summary were provided. Return in about 4 weeks (around 6/9/2022). or earlier as needed.       Latha Sutton MD

## 2022-05-13 DIAGNOSIS — I10 ESSENTIAL HYPERTENSION: ICD-10-CM

## 2022-05-13 DIAGNOSIS — J45.40 MODERATE PERSISTENT ASTHMA WITHOUT COMPLICATION: ICD-10-CM

## 2022-05-13 DIAGNOSIS — F41.9 ANXIETY: ICD-10-CM

## 2022-05-13 RX ORDER — LISINOPRIL 20 MG/1
20 TABLET ORAL DAILY
Qty: 90 TABLET | Refills: 1 | Status: SHIPPED | OUTPATIENT
Start: 2022-05-13 | End: 2022-06-16

## 2022-05-13 RX ORDER — FLUTICASONE PROPIONATE AND SALMETEROL 100; 50 UG/1; UG/1
1 POWDER RESPIRATORY (INHALATION) EVERY 12 HOURS
Qty: 1 EACH | Refills: 3 | Status: SHIPPED | OUTPATIENT
Start: 2022-05-13 | End: 2022-06-16 | Stop reason: DRUGHIGH

## 2022-05-13 RX ORDER — LISINOPRIL 20 MG/1
20 TABLET ORAL DAILY
Qty: 90 TABLET | Refills: 1 | Status: SHIPPED | OUTPATIENT
Start: 2022-05-13 | End: 2022-05-13 | Stop reason: SDUPTHER

## 2022-05-13 NOTE — TELEPHONE ENCOUNTER
Patients wife called stating the pharmacy  hasn't received is refill of these medications. The pharmacy that he's using is [facility].

## 2022-06-08 ENCOUNTER — HOSPITAL ENCOUNTER (OUTPATIENT)
Age: 37
Discharge: HOME | End: 2022-06-08
Payer: COMMERCIAL

## 2022-06-08 LAB
ALBUMIN SERPL-MCNC: 4.2 GM/DL (ref 3.4–5)
ALP BLD-CCNC: 100 IU/L (ref 40–129)
ALT SERPL-CCNC: 18 U/L (ref 10–40)
ANION GAP SERPL CALCULATED.3IONS-SCNC: 10 MMOL/L (ref 4–16)
AST SERPL-CCNC: 25 IU/L (ref 15–37)
BASOPHILS ABSOLUTE: 0 K/CU MM
BASOPHILS RELATIVE PERCENT: 0.3 % (ref 0–1)
BILIRUB SERPL-MCNC: 1.1 MG/DL (ref 0–1)
BUN BLDV-MCNC: 14 MG/DL (ref 6–23)
CALCIUM SERPL-MCNC: 9.3 MG/DL (ref 8.3–10.6)
CHLORIDE BLD-SCNC: 103 MMOL/L (ref 99–110)
CO2: 28 MMOL/L (ref 21–32)
CREAT SERPL-MCNC: 0.9 MG/DL (ref 0.9–1.3)
DIFFERENTIAL TYPE: ABNORMAL
EOSINOPHILS ABSOLUTE: 0.1 K/CU MM
EOSINOPHILS RELATIVE PERCENT: 1.5 % (ref 0–3)
ESTIMATED AVERAGE GLUCOSE: 200 MG/DL
GFR AFRICAN AMERICAN: >60 ML/MIN/1.73M2
GFR NON-AFRICAN AMERICAN: >60 ML/MIN/1.73M2
GLUCOSE BLD-MCNC: 156 MG/DL (ref 70–99)
HBA1C MFR BLD: 8.6 % (ref 4.2–6.3)
HCT VFR BLD CALC: 44.2 % (ref 42–52)
HEMOGLOBIN: 13.7 GM/DL (ref 13.5–18)
IMMATURE NEUTROPHIL %: 0.4 % (ref 0–0.43)
LYMPHOCYTES ABSOLUTE: 1.9 K/CU MM
LYMPHOCYTES RELATIVE PERCENT: 24.7 % (ref 24–44)
MCH RBC QN AUTO: 29 PG (ref 27–31)
MCHC RBC AUTO-ENTMCNC: 31 % (ref 32–36)
MCV RBC AUTO: 93.4 FL (ref 78–100)
MONOCYTES ABSOLUTE: 0.8 K/CU MM
MONOCYTES RELATIVE PERCENT: 10.1 % (ref 0–4)
NUCLEATED RBC %: 0 %
PDW BLD-RTO: 13.9 % (ref 11.7–14.9)
PLATELET # BLD: 172 K/CU MM (ref 140–440)
PMV BLD AUTO: 10.3 FL (ref 7.5–11.1)
POTASSIUM SERPL-SCNC: 4.8 MMOL/L (ref 3.5–5.1)
RBC # BLD: 4.73 M/CU MM (ref 4.6–6.2)
SEGMENTED NEUTROPHILS ABSOLUTE COUNT: 4.7 K/CU MM
SEGMENTED NEUTROPHILS RELATIVE PERCENT: 63 % (ref 36–66)
SODIUM BLD-SCNC: 141 MMOL/L (ref 135–145)
TOTAL IMMATURE NEUTOROPHIL: 0.03 K/CU MM
TOTAL NUCLEATED RBC: 0 K/CU MM
TOTAL PROTEIN: 7.5 GM/DL (ref 6.4–8.2)
TSH HIGH SENSITIVITY: 2.56 UIU/ML (ref 0.27–4.2)
WBC # BLD: 7.5 K/CU MM (ref 4–10.5)

## 2022-06-08 PROCEDURE — 85025 COMPLETE CBC W/AUTO DIFF WBC: CPT

## 2022-06-08 PROCEDURE — 83036 HEMOGLOBIN GLYCOSYLATED A1C: CPT

## 2022-06-08 PROCEDURE — 80053 COMPREHEN METABOLIC PANEL: CPT

## 2022-06-08 PROCEDURE — 36415 COLL VENOUS BLD VENIPUNCTURE: CPT

## 2022-06-08 PROCEDURE — 84443 ASSAY THYROID STIM HORMONE: CPT

## 2022-06-16 ENCOUNTER — OFFICE VISIT (OUTPATIENT)
Dept: INTERNAL MEDICINE CLINIC | Age: 37
End: 2022-06-16
Payer: COMMERCIAL

## 2022-06-16 VITALS
HEIGHT: 74 IN | BODY MASS INDEX: 40.43 KG/M2 | HEART RATE: 104 BPM | OXYGEN SATURATION: 94 % | DIASTOLIC BLOOD PRESSURE: 80 MMHG | WEIGHT: 315 LBS | SYSTOLIC BLOOD PRESSURE: 136 MMHG

## 2022-06-16 DIAGNOSIS — I10 ESSENTIAL HYPERTENSION: ICD-10-CM

## 2022-06-16 DIAGNOSIS — J45.40 MODERATE PERSISTENT ASTHMA WITHOUT COMPLICATION: ICD-10-CM

## 2022-06-16 DIAGNOSIS — R00.0 TACHYCARDIA: ICD-10-CM

## 2022-06-16 DIAGNOSIS — F41.9 ANXIETY: ICD-10-CM

## 2022-06-16 DIAGNOSIS — E11.9 TYPE 2 DIABETES MELLITUS WITHOUT COMPLICATION, WITHOUT LONG-TERM CURRENT USE OF INSULIN (HCC): Primary | ICD-10-CM

## 2022-06-16 DIAGNOSIS — E03.9 ACQUIRED HYPOTHYROIDISM: ICD-10-CM

## 2022-06-16 DIAGNOSIS — E78.5 HYPERLIPIDEMIA, UNSPECIFIED HYPERLIPIDEMIA TYPE: ICD-10-CM

## 2022-06-16 DIAGNOSIS — E66.01 CLASS 3 SEVERE OBESITY DUE TO EXCESS CALORIES WITHOUT SERIOUS COMORBIDITY WITH BODY MASS INDEX (BMI) OF 45.0 TO 49.9 IN ADULT (HCC): ICD-10-CM

## 2022-06-16 PROCEDURE — 3052F HG A1C>EQUAL 8.0%<EQUAL 9.0%: CPT | Performed by: FAMILY MEDICINE

## 2022-06-16 PROCEDURE — 99214 OFFICE O/P EST MOD 30 MIN: CPT | Performed by: FAMILY MEDICINE

## 2022-06-16 RX ORDER — FLUTICASONE PROPIONATE AND SALMETEROL 250; 50 UG/1; UG/1
1 POWDER RESPIRATORY (INHALATION) EVERY 12 HOURS
Qty: 60 EACH | Refills: 3 | Status: SHIPPED | OUTPATIENT
Start: 2022-06-16 | End: 2022-09-15 | Stop reason: DRUGHIGH

## 2022-06-16 RX ORDER — BLOOD-GLUCOSE METER
1 KIT MISCELLANEOUS DAILY
Qty: 1 KIT | Refills: 0 | Status: SHIPPED | OUTPATIENT
Start: 2022-06-16

## 2022-06-16 RX ORDER — GLUCOSAMINE HCL/CHONDROITIN SU 500-400 MG
CAPSULE ORAL
Qty: 100 STRIP | Refills: 1 | Status: SHIPPED | OUTPATIENT
Start: 2022-06-16 | End: 2022-10-11

## 2022-06-16 RX ORDER — LISINOPRIL 40 MG/1
40 TABLET ORAL DAILY
Qty: 90 TABLET | Refills: 1 | Status: SHIPPED | OUTPATIENT
Start: 2022-06-16

## 2022-06-16 RX ORDER — LANCETS 30 GAUGE
1 EACH MISCELLANEOUS DAILY
Qty: 100 EACH | Refills: 1 | Status: SHIPPED | OUTPATIENT
Start: 2022-06-16

## 2022-06-16 RX ORDER — METFORMIN HYDROCHLORIDE 500 MG/1
500 TABLET, EXTENDED RELEASE ORAL 2 TIMES DAILY WITH MEALS
Qty: 60 TABLET | Refills: 3 | Status: SHIPPED | OUTPATIENT
Start: 2022-06-16

## 2022-06-16 ASSESSMENT — ENCOUNTER SYMPTOMS
COLOR CHANGE: 0
SORE THROAT: 0
CHEST TIGHTNESS: 0
ABDOMINAL PAIN: 0
CONSTIPATION: 0
VOMITING: 0
SHORTNESS OF BREATH: 0
DIARRHEA: 0

## 2022-06-16 NOTE — PROGRESS NOTES
Subjective:      Chief Complaint   Patient presents with    Swelling     in ankles    Hypertension    Discuss Medications     lisinopril- been taking 40 mg but states he should be on 30 mg tabs       HPI:  Aram Vasquez is a 31 y.o. male who presents today for follow up of chronic conditions as listed below. HTN:  Patient was taking lisinopril 30mg daily, but states he has been taking 40mg for the past few months because it was difficult to cut tabs in half. States he has been taking his BP's at home- is not sure of readings, but reports they have been normal.  Denies any headaches, lightheadedness, etc.    Hypothyroidism:  Has not been taking synthroid, denies symptoms. Asthma:  Was started on advair at last appointment as patient was using albuterol almost every hour while awake. States he has been compliant, using 1 puff BID. States he has noticed some improvement in symptoms, but is still using albuterol multiple times a day. Anxiety:  Still taking zoloft 50mg daily. Reports symptoms have been stable. Diabetes:  New diagnosis. Unsure whether he has any family history of DM. States he is still drinking 1-2 L of regular soda a day, eats significant amount of sweets and carbs as well. Does walk a lot at work, but otherwise does not exercise.         Past Medical History:   Diagnosis Date    Anxiety     \"About Surgery\"    Asthma     \"As a child, no problems since then\"    Chronic back pain     Foot callus     \"Bilateral\"    Headache(784.0)     \"Use to have intense headaches\"    Meningitis spinal 1987 or 1988    Phobia     \"Phobia Of Needles\"    Shortness of breath on exertion         Past Surgical History:   Procedure Laterality Date    KNEE ARTHROSCOPY  [id number]    rt knee lateral menisectomy    OTHER SURGICAL HISTORY  1997    \"Removal Inhaled  Paper Clips From Tonsils\"       Social History     Tobacco Use    Smoking status: Former Smoker     Packs/day: 1.00     Years: 10.00 Pack years: 10.00     Types: Cigarettes     Quit date: 2022     Years since quittin.2    Smokeless tobacco: Former User     Quit date: 6/15/2002    Tobacco comment: \"Occ chewing tobacco when quit in \"   Substance Use Topics    Alcohol use: Yes     Comment: \"Occ\"        Review of Systems   Constitutional: Negative for activity change, appetite change, chills, fever and unexpected weight change. HENT: Negative for congestion and sore throat. Respiratory: Negative for chest tightness and shortness of breath. Cardiovascular: Negative for chest pain and palpitations. Gastrointestinal: Negative for abdominal pain, constipation, diarrhea and vomiting. Genitourinary: Negative for dysuria. Skin: Negative for color change. Neurological: Negative for dizziness and light-headedness. Psychiatric/Behavioral: Negative for dysphoric mood. The patient is not nervous/anxious. Prior to Visit Medications    Medication Sig Taking? Authorizing Provider   fluticasone-salmeterol (ADVAIR DISKUS) 100-50 MCG/ACT AEPB diskus inhaler Inhale 1 puff into the lungs every 12 hours Yes Shantell Nair MD   sertraline (ZOLOFT) 50 MG tablet Take 1 tablet by mouth daily Yes Shantell Nair MD   lisinopril (PRINIVIL;ZESTRIL) 20 MG tablet Take 1 tablet by mouth daily  Patient taking differently: Take 40 mg by mouth daily  Yes Shantell Nair MD   albuterol sulfate HFA (VENTOLIN HFA) 108 (90 Base) MCG/ACT inhaler Inhale 2 puffs into the lungs every 4 hours as needed for Wheezing or Shortness of Breath Yes Shantell Nair MD          Objective:      /80   Pulse (!) 104   Ht 6' 2\" (1.88 m)   Wt (!) 355 lb (161 kg)   SpO2 94%   BMI 45.58 kg/m²      Physical Exam  Vitals and nursing note reviewed. Constitutional:       General: He is not in acute distress. Appearance: Normal appearance. He is not ill-appearing or toxic-appearing. HENT:      Head: Normocephalic and atraumatic. Right Ear: External ear normal.      Left Ear: External ear normal.      Nose: Nose normal.      Mouth/Throat:      Pharynx: Oropharynx is clear. Eyes:      General: No scleral icterus. Right eye: No discharge. Left eye: No discharge. Extraocular Movements: Extraocular movements intact. Conjunctiva/sclera: Conjunctivae normal.   Cardiovascular:      Rate and Rhythm: Normal rate and regular rhythm. Heart sounds: Normal heart sounds. Pulmonary:      Effort: Pulmonary effort is normal.      Breath sounds: Normal breath sounds. No wheezing or rales. Musculoskeletal:         General: No deformity. Cervical back: Normal range of motion and neck supple. No rigidity. Skin:     General: Skin is warm and dry. Findings: No rash. Neurological:      General: No focal deficit present. Mental Status: He is alert. Mental status is at baseline. Motor: No weakness. Psychiatric:         Mood and Affect: Mood normal.         Behavior: Behavior normal.            Assessment / Plan:      1. Type 2 diabetes mellitus without complication, without long-term current use of insulin ([facility] [address]  New diagnosis with HbA1c 8.6. Had extensive discussion regarding management and importance of lifestyle modifications (in conjunction with medication). Advised to cut out soda and work on cutting down on sweets. Also discussed glucose goals and monitoring fasting glucose readings. Will start metformin and refer to diabetic educator. Will repeat labs in 3 months. - metFORMIN (GLUCOPHAGE-XR) 500 MG extended release tablet; Take 1 tablet by mouth 2 times daily (with meals)  Dispense: 60 tablet; Refill: 3  - [facility] (Ambulatory)  - Hemoglobin A1C; Future  - glucose monitoring (FREESTYLE FREEDOM) kit; 1 kit by Does not apply route daily  Dispense: 1 kit; Refill: 0  - Lancets MISC; 1 each by Does not apply route daily  Dispense: 100 each;  Refill: 1  - blood glucose monitor strips; Test 1 times a day & as needed for symptoms of irregular blood glucose. Dispense sufficient amount for indicated testing frequency plus additional to accommodate PRN testing needs. Dispense: 100 strip; Refill: 1    2. Essential hypertension  Patient increased dose of lisinopril to 40mg- readings wnl, will continue. Advised to bring in home log to next appointment. - lisinopril (PRINIVIL;ZESTRIL) 40 MG tablet; Take 1 tablet by mouth daily  Dispense: 90 tablet; Refill: 1  - Comprehensive Metabolic Panel; Future  - CBC with Auto Differential; Future    3. Anxiety  Stable, well controlled. Continue current medications. 4. Acquired hypothyroidism  TSH wnl off of synthroid, may stay off of medication. 5. Hyperlipidemia, unspecified hyperlipidemia type  Did not get lab completed, will reorder.   - Lipid Panel; Future    6. Moderate persistent asthma without complication  Mild improvement with starting ICS/LABA but still poorly controlled- will increase dose. - fluticasone-salmeterol (ADVAIR DISKUS) 250-50 MCG/ACT AEPB diskus inhaler; Inhale 1 puff into the lungs every 12 hours  Dispense: 60 each; Refill: 3    7. Tachycardia  Patient tachycardic but just took puff of albuterol before vitals. 8. Class 3 severe obesity due to excess calories without serious comorbidity with body mass index (BMI) of 45.0 to 49.9 in [facility])  Discussed importance of lifestyle modifications- will continue to monitor. I have spent 35 minutes on this patient encounter. Patient voiced understanding and agreement with plan. All questions/concerns were addressed, risks/side effects of medications were reviewed. Return precautions and after visit summary were provided. Return in about 3 months (around 9/16/2022). or earlier as needed.       Ula Lanes, MD

## 2022-06-29 ENCOUNTER — NURSE ONLY (OUTPATIENT)
Dept: FAMILY MEDICINE CLINIC | Age: 37
End: 2022-06-29
Payer: COMMERCIAL

## 2022-06-29 DIAGNOSIS — E11.9 TYPE 2 DIABETES MELLITUS WITHOUT COMPLICATION, WITHOUT LONG-TERM CURRENT USE OF INSULIN (HCC): Primary | ICD-10-CM

## 2022-06-29 PROCEDURE — 98960 EDU&TRN PT SELF-MGMT NQHP 1: CPT | Performed by: FAMILY MEDICINE

## 2022-06-29 NOTE — PROGRESS NOTES
[address] [place] [address] for Diabetes Health  Diabetes Self-Management Education & Support Program    Reason for Referral: new diagnosis  Referral Source: Lexis Burrell MD  Services requested: DSMES       ASSESSMENT    From my perspective, the participant would benefit from [facility] specifically related to reducing risks, healthy eating, monitoring, taking medications, physical activity, healthy coping, and problem solving. Will adapt DSMES program to build on participant's confidence score as noted in the Diabetes Skills, Confidence, and Preparedness Index. During the program, we will focus on providing DSMES that specifically addresses participant's interest in reducing risks, healthy eating, monitoring, taking medications, physical activity, healthy coping, and problem solving, as shown by their reported readiness to change. The participant would be best served by attending weekly individual sessions. Diabetes Self-Management Education Follow-up Visit: TBD       Clinical Presentation  Hesham Thibodeaux is a 31 y.o. White male referred for diabetes self-management education. Participant has Type 2 DM on insulin for <1 year. Family history positive for diabetes. Patient reports not receiving DSMES services in the past. Most recent A1c value:   Lab Results   Component Value Date    LABA1C 8.6 06/08/2022       Diabetes-related medical history:  Acute complications  Not known with this patient. Neurological complications  Not known with this patient. Microvascular disease  Not known with this patient. Macrovascular disease  Not known with this patient. Other associated conditions     Obesity, chronic pain    Diabetes-related medications:  Current dosing: metFORMIN - 500 MG BID    Blood Pressure Management  lisinopril - 40 MG      Lipid Management  This patient does not have an active medication from one of the medication groupers.       Clot Prevention  This patient does not have an active medication from one of the medication groupers. Learning Assessment  Learning objectives Educator assessment (6/29/2022)   Diabetes Disease Process  The participant can   A) describe diabetes in basic terms;   B) state the type of diabetes they have; &   C) state accepted blood glucose targets. Healthy Eating  The participant can   A) identify carbohydrate foods; &   B) accurately read food labels. Being Active  The participant can  A) state the benefits of physical activity;  B) report their current PA practices;  C) identify PA they would consider incorporating in their lives; &  D) develop an implementation plan. Monitoring  The participant can  A) operate their blood glucose meter; &  B) describe how they log their blood glucoses to share with their provider. Taking Medications  The participant can  A) name their diabetes medications;  B) state the purpose and dose;  C) note side effects; &  D) describe proper storage, disposal & transport (if appropriate). Healthy Coping  The participant can    A) describe their response to diabetes diagnosis; B) describe their specific coping mechanisms;  C) identify supportive people and/or other resources that positively support their diabetes self-care and health. Reducing Risks  The participant can describe the preventive measures used by providers to promote health and prevent diabetes complications. Problem Solving  The participant can   A) identify signs, symptoms & treatment of hypoglycemia;    B) identify signs, symptoms & treatment of hyperglycemia;  C) describe their sick day plan; &  D) identify BG patterns to discuss with their provider.        No  No  No        Yes  No        No  No  No  No        Yes  No          Yes  No  No  No            Yes  No  No        No          No  No  No  No     Characteristics to Learning   Barriers to Learning      Low health literacy     Favorite Ways to Learn   [x] Lecture  [] Slides  [] Reading [] Video-Internet  [] Cassettes/CDs/MP3's  [] Interactive Small Groups [] Other       Behavioral Assessment  Current self-care practices  Educator assessment (6/29/2022)   Healthy Eating   Current practices    24-hour Dietary Recall:  Unable to recall       Would benefit from [facility] related to Healthy Eating: Yes    Eats a carbohydrate controlled diet: No     Stage of change: Preparation      Being Active  Current practices  How many days during the past week have you performed physical activity where your heart beats faster and your breathing is harder than normal for 30 minutes or more?  0 day(s)    How many days in a typical week do you perform activity such as this?  0 day(s)     Would benefit from [facility] related to Being Active: Yes      Exercises 150 minutes/week: No      Stage of change: Contemplation     Monitoring  Current practices  Do you monitor your blood sugar? Yes    How often do you monitor? 3x/day    What are the range of readings? 109-144 mg/dL  Breakfast: 120 mg/dL   Lunch: 130 mg/dL   Dinner: 150 mg/dL  testing as needed for hypoglycemia    Do you know your last A1c measurement? No    Do you know the meaning of the A1c? No     Would benefit from [facility] related to Monitoring: No      Uses BG readings to establish trends and understand BG patterns: No      Stage of change: Maintenance    Taking Medication  Current practices  Do you understand what your diabetes medications do? No    How often do you miss doses of your diabetes medications? never    Can you afford your diabetes medications? Yes   Would benefit from [facility] related to Taking Medication: Yes      Takes medications consistently to receive full benefit: Yes      Stage of change: Maintenance        Healthy Coping   Current state  Diabetes Skills, Confidence and Preparedness Index:   Total score: 4.3  Skills: 3.4  Confidence: 4.6  Preparedness: 5.2       Would benefit from DSMES related to Healthy Coping: Yes      Identifies specific people, organizations,etc, that actively support their diabetes self-care efforts: No      Stage of change: Preparation     Reducing Risks  Current state  Vaccines:  Influenza: due     Pneumococcal: due     Hepatitis:  no records available      Examinations:  Eye exam:  not assessed       Dental exam:  not assessed    Foot exam: due      Heart Protection:  BP Readings from Last 2 Encounters:   06/16/22 136/80   05/12/22 (!) 144/90        No results found for: LDL, LDLC, LDLCALC     Kidney Protection:  No results found for: Mercedes Fox, MCAU, MCAU2     Would benefit from [facility] related to Reducing Risks: Yes      Actively participates in decision-making with provider regarding secondary prevention:  No      Stage of change: Contemplation   Problem Solving  Current state  Hypoglycemia Management:  What are signs and symptoms of hypoglycemia that you experience: Dizziness/light-headedness    How do you prevent hypoglycemia: patient is unaware of how to treat low blood sugars    How do you treat hypoglycemia: Patient is unaware of how to treat hypoglycemia    Hyperglycemia Management:  What are signs and symptoms of hyperglycemia that you experience: Pt reported being unaware of s/s of hyperglycemia    How can you prevent hyperglycemia: patient is unaware of how to prevent high blood sugars    Sick Day Management:  What do you do differently on sick days:  Pt reported being unaware of self-management on sick days    Pattern Management:  Do you notice blood glucose patterns when you look at the readings in your meter or logbook?  No    How do you use the blood glucose readings from your meter or logbook? understand how body responds to meals     Would benefit from [facility] related to Problem Solving: Yes      Articulates appropriate strategies to address hypoglycemia, hyperglycemia, sick day care and BG pattern: No      Stage of change: Action      Note: Content derived from the American Association of Diabetes Educators' Diabetes Education Curriculum: A Guide to Successful Self-Management (3rd edition)      Joby De León RN on 6/29/2022 at 9:44 AM    I have personally reviewed the health record, including provider notes, laboratory data and current medications before making these care and education recommendations. The time spent in this effort is included in the total time.   Total minutes: 60

## 2022-06-30 ENCOUNTER — TELEPHONE (OUTPATIENT)
Dept: INTERNAL MEDICINE CLINIC | Age: 37
End: 2022-06-30

## 2022-06-30 DIAGNOSIS — E11.9 TYPE 2 DIABETES MELLITUS WITHOUT COMPLICATION, WITHOUT LONG-TERM CURRENT USE OF INSULIN (HCC): ICD-10-CM

## 2022-06-30 NOTE — TELEPHONE ENCOUNTER
Called and spoke with pt and PCP's instructions and recommendations given to pt. Pt voiced understanding and chose not to make an appt sooner that the scheduled Sept. 2022 appt.

## 2022-06-30 NOTE — TELEPHONE ENCOUNTER
Please advise. I can call and schedule patient for an earlier appointment if you want to see him sooner.

## 2022-06-30 NOTE — TELEPHONE ENCOUNTER
Patient called stating that he has been using multiple testing strips every day to get the hang of testing blood sugar levels. He is requesting more strips to be sent to pharmacy. Patient also states at last visit Dr. Edy Morales told him that he needs to be using his inhaler more often. He wants to verify if this should be more than 2x a day, which is how many times a day he uses it now. Patient also concerned with waiting until September for a follow up with Dr. Edy Morales.  He states to make him feel more comfortable/ease his mind, he would like to be seen at the end of July/beginning of August.

## 2022-06-30 NOTE — TELEPHONE ENCOUNTER
I increased his advair dose at his last appointment, which he should be using 1 puff twice a day. With regular use of advair twice a day, he should not be needing his albuterol inhaler as much as he used to. He can still use it as needed, but the goal of therapy is to minimize the need for albuterol. If he wants to be seen sooner to discuss his asthma, we can. However, as far as his diabetes, I won't be able to change much or give him any new information without updated labs (HbA1c), which we can't do any sooner than 3 months. I ordered 100 test strips for him 2 weeks ago- has he used them all? Please advise him to limit testing to 3-4 times a day at most.      Please notify, thanks.

## 2022-08-10 ENCOUNTER — TELEPHONE (OUTPATIENT)
Dept: INTERNAL MEDICINE CLINIC | Age: 37
End: 2022-08-10

## 2022-08-10 NOTE — TELEPHONE ENCOUNTER
Pt called stating that he is on Metformin for diabetes. Pt states he is now having issues with fluid retention. Pt is wanting to know if he can take a diuretic. Do you need to see pt first? Please advise. Pt's next appt is 9/15/22.

## 2022-08-12 ENCOUNTER — OFFICE VISIT (OUTPATIENT)
Dept: INTERNAL MEDICINE CLINIC | Age: 37
End: 2022-08-12
Payer: COMMERCIAL

## 2022-08-12 VITALS
HEART RATE: 117 BPM | DIASTOLIC BLOOD PRESSURE: 71 MMHG | WEIGHT: 315 LBS | BODY MASS INDEX: 46.22 KG/M2 | SYSTOLIC BLOOD PRESSURE: 134 MMHG | OXYGEN SATURATION: 97 %

## 2022-08-12 DIAGNOSIS — I10 ESSENTIAL HYPERTENSION: ICD-10-CM

## 2022-08-12 DIAGNOSIS — R60.0 MILD PERIPHERAL EDEMA: Primary | ICD-10-CM

## 2022-08-12 DIAGNOSIS — E03.9 ACQUIRED HYPOTHYROIDISM: ICD-10-CM

## 2022-08-12 DIAGNOSIS — E11.9 TYPE 2 DIABETES MELLITUS WITHOUT COMPLICATION, WITHOUT LONG-TERM CURRENT USE OF INSULIN (HCC): ICD-10-CM

## 2022-08-12 LAB
A/G RATIO: 1.2 (ref 1.1–2.2)
ALBUMIN SERPL-MCNC: 3.7 G/DL (ref 3.4–5)
ALP BLD-CCNC: 97 U/L (ref 40–129)
ALT SERPL-CCNC: 21 U/L (ref 10–40)
ANION GAP SERPL CALCULATED.3IONS-SCNC: 11 MMOL/L (ref 3–16)
AST SERPL-CCNC: 30 U/L (ref 15–37)
BILIRUB SERPL-MCNC: 1.7 MG/DL (ref 0–1)
BUN BLDV-MCNC: 21 MG/DL (ref 7–20)
CALCIUM SERPL-MCNC: 8.7 MG/DL (ref 8.3–10.6)
CHLORIDE BLD-SCNC: 104 MMOL/L (ref 99–110)
CO2: 25 MMOL/L (ref 21–32)
CREAT SERPL-MCNC: 0.9 MG/DL (ref 0.9–1.3)
GFR AFRICAN AMERICAN: >60
GFR NON-AFRICAN AMERICAN: >60
GLUCOSE BLD-MCNC: 109 MG/DL (ref 70–99)
POTASSIUM SERPL-SCNC: 4.7 MMOL/L (ref 3.5–5.1)
SODIUM BLD-SCNC: 140 MMOL/L (ref 136–145)
TOTAL PROTEIN: 6.9 G/DL (ref 6.4–8.2)
TSH SERPL DL<=0.05 MIU/L-ACNC: 3.24 UIU/ML (ref 0.27–4.2)

## 2022-08-12 PROCEDURE — 81002 URINALYSIS NONAUTO W/O SCOPE: CPT

## 2022-08-12 PROCEDURE — 99213 OFFICE O/P EST LOW 20 MIN: CPT

## 2022-08-12 PROCEDURE — 3052F HG A1C>EQUAL 8.0%<EQUAL 9.0%: CPT

## 2022-08-12 RX ORDER — FUROSEMIDE 20 MG/1
20 TABLET ORAL DAILY
Qty: 14 TABLET | Refills: 0 | Status: SHIPPED | OUTPATIENT
Start: 2022-08-12 | End: 2022-08-26

## 2022-08-12 NOTE — PROGRESS NOTES
Subjective:      Chief Complaint   Patient presents with    Swelling     Retaining water in legs/feet     HPI:  Nicolas Colon is a [address] y.o. male who presents today for mild peripheral edema worsened x 2-3 weeks. Patient reports he has adjusted diet and initially lost 13 lbs over 2 months, then noticed swelling and weight has gone back up. Denies shortness of breath, chest pain, injury to legs, erythema, palpitations, trouble sleeping. Past Medical History:   Diagnosis Date    Anxiety     \"About Surgery\"    Asthma     \"As a child, no problems since then\"    Chronic back pain     Foot callus     \"Bilateral\"    Headache(784.0)     \"Use to have intense headaches\"    Meningitis spinal  or     Phobia     \"Phobia Of Needles\"    Shortness of breath on exertion       Past Surgical History:   Procedure Laterality Date    KNEE ARTHROSCOPY  [id number]    rt knee lateral menisectomy    OTHER SURGICAL HISTORY      \"Removal Inhaled  Paper Clips From Tonsils\"     Social History     Tobacco Use    Smoking status: Former     Packs/day: 1.00     Years: 10.00     Pack years: 10.00     Types: Cigarettes     Quit date: 2022     Years since quittin.4    Smokeless tobacco: Former     Quit date: 6/15/2002    Tobacco comments: \"Occ chewing tobacco when quit in \"   Substance Use Topics    Alcohol use: Yes     Comment: \"Occ\"      Review of Systems   Constitutional:  Negative for fatigue and fever. HENT:  Negative for congestion, facial swelling, rhinorrhea and sore throat. Eyes:  Negative for visual disturbance. Respiratory:  Negative for cough, chest tightness, shortness of breath and wheezing. Cardiovascular:  Positive for leg swelling. Negative for chest pain and palpitations. Gastrointestinal:  Negative for abdominal pain, constipation, diarrhea, nausea and vomiting. Genitourinary:  Negative for difficulty urinating.    Musculoskeletal:  Negative for arthralgias, gait problem, joint swelling and myalgias. Skin:  Negative for color change. Neurological:  Negative for dizziness and syncope. Prior to Visit Medications    Medication Sig Taking? Authorizing Provider   metFORMIN (GLUCOPHAGE-XR) 500 MG extended release tablet Take 1 tablet by mouth 2 times daily (with meals)  Ronald Lin MD   fluticasone-salmeterol (ADVAIR DISKUS) 250-50 MCG/ACT AEPB diskus inhaler Inhale 1 puff into the lungs every 12 hours  Ronald Lin MD   lisinopril (PRINIVIL;ZESTRIL) 40 MG tablet Take 1 tablet by mouth daily  Ronald Lin MD   glucose monitoring (FREESTYLE FREEDOM) kit 1 kit by Does not apply route daily  Ronald Lin MD   Lancets MISC 1 each by Does not apply route daily  Ronald Lin MD   blood glucose monitor strips Test 1 times a day & as needed for symptoms of irregular blood glucose. Dispense sufficient amount for indicated testing frequency plus additional to accommodate PRN testing needs. Ronald Lin MD   sertraline (ZOLOFT) 50 MG tablet Take 1 tablet by mouth daily  Ronald Lin MD   albuterol sulfate HFA (VENTOLIN HFA) 108 (90 Base) MCG/ACT inhaler Inhale 2 puffs into the lungs every 4 hours as needed for Wheezing or Shortness of Breath  Ronald Lin MD        Objective:      /71   Pulse (!) 117   Wt (!) 360 lb (163.3 kg)   SpO2 97%   BMI 46.22 kg/m²    Physical Exam  Vitals reviewed. Constitutional:       General: He is awake. Appearance: Normal appearance. He is well-developed. HENT:      Head: Normocephalic. Right Ear: External ear normal.      Left Ear: External ear normal.      Nose: Nose normal.      Mouth/Throat:      Mouth: Mucous membranes are moist.      Pharynx: Oropharynx is clear. Eyes:      Extraocular Movements: Extraocular movements intact. Conjunctiva/sclera: Conjunctivae normal.      Pupils: Pupils are equal, round, and reactive to light.    Cardiovascular:      Rate and Rhythm: Normal rate and regular rhythm. Pulses: Normal pulses. Heart sounds: Normal heart sounds, S1 normal and S2 normal.   Pulmonary:      Effort: Pulmonary effort is normal.      Breath sounds: Normal breath sounds. Abdominal:      General: Bowel sounds are normal.      Palpations: Abdomen is soft. Musculoskeletal:         General: Normal range of motion. Cervical back: Normal range of motion. Right lower le+ Pitting Edema present. Left lower le+ Pitting Edema present. Skin:     General: Skin is warm and dry. Capillary Refill: Capillary refill takes less than 2 seconds. Neurological:      General: No focal deficit present. Mental Status: He is alert and oriented to person, place, and time. Mental status is at baseline. GCS: GCS eye subscore is 4. GCS verbal subscore is 5. GCS motor subscore is 6. Cranial Nerves: Cranial nerves are intact. Sensory: Sensation is intact. Motor: Motor function is intact. Coordination: Coordination is intact. Gait: Gait is intact. Psychiatric:         Mood and Affect: Mood normal.         Behavior: Behavior normal. Behavior is cooperative. Thought Content: Thought content normal.         Judgment: Judgment normal.      Assessment / Plan:      1. Mild peripheral edema  Patient assessment shows bilateral 1+ pitting edema on lower extremities. Patient recalls worse after working on feet all day. Discussed dietary changes to decrease salt intake. Labs today to r/o contributing factors from other concurrent disease processes including hypertension, diabetes, and hypothyroidism. Addition of Lasix to medication regimen x 2 weeks with f/u in office at that time to re-evaluate and review labs. - Comprehensive Metabolic Panel  - POCT Urinalysis no Micro  - TSH  - furosemide (LASIX) 20 MG tablet; Take 1 tablet by mouth in the morning for 14 days. Dispense: 14 tablet;  Refill: 0     I have spent 25 minutes on this patient encounter. Patient voiced understanding and agreement with plan. All questions/concerns were addressed, risks/side effects of medications were reviewed. Return precautions and after visit summary were provided. Return in about 2 weeks (around 8/26/2022). or earlier as needed. Please note this report has been produced using speech recognition software and may contain errors related to that system including errors in grammar, punctuation, and spelling, as well as words and phrases that may be inappropriate. If there are any questions or concerns please feel free to contact the dictating provider for clarification.     Hillary Hill, LIZ - CNP

## 2022-08-13 ASSESSMENT — ENCOUNTER SYMPTOMS
COUGH: 0
SHORTNESS OF BREATH: 0
NAUSEA: 0
SORE THROAT: 0
ABDOMINAL PAIN: 0
RHINORRHEA: 0
CONSTIPATION: 0
DIARRHEA: 0
FACIAL SWELLING: 0
WHEEZING: 0
CHEST TIGHTNESS: 0
COLOR CHANGE: 0
VOMITING: 0

## 2022-08-26 ENCOUNTER — OFFICE VISIT (OUTPATIENT)
Dept: INTERNAL MEDICINE CLINIC | Age: 37
End: 2022-08-26
Payer: COMMERCIAL

## 2022-08-26 VITALS
DIASTOLIC BLOOD PRESSURE: 80 MMHG | HEART RATE: 108 BPM | BODY MASS INDEX: 44.04 KG/M2 | SYSTOLIC BLOOD PRESSURE: 134 MMHG | WEIGHT: 315 LBS | OXYGEN SATURATION: 95 %

## 2022-08-26 DIAGNOSIS — I10 ESSENTIAL HYPERTENSION: ICD-10-CM

## 2022-08-26 DIAGNOSIS — R60.0 MILD PERIPHERAL EDEMA: Primary | ICD-10-CM

## 2022-08-26 DIAGNOSIS — E11.9 TYPE 2 DIABETES MELLITUS WITHOUT COMPLICATION, WITHOUT LONG-TERM CURRENT USE OF INSULIN (HCC): ICD-10-CM

## 2022-08-26 LAB — HBA1C MFR BLD: 6.6 %

## 2022-08-26 PROCEDURE — 83036 HEMOGLOBIN GLYCOSYLATED A1C: CPT

## 2022-08-26 PROCEDURE — 3044F HG A1C LEVEL LT 7.0%: CPT

## 2022-08-26 PROCEDURE — 99213 OFFICE O/P EST LOW 20 MIN: CPT

## 2022-08-26 RX ORDER — BLOOD PRESSURE TEST KIT
KIT MISCELLANEOUS
Qty: 1 KIT | Refills: 0 | Status: SHIPPED | OUTPATIENT
Start: 2022-08-26

## 2022-08-26 RX ORDER — FUROSEMIDE 20 MG/1
20 TABLET ORAL DAILY
Qty: 90 TABLET | Refills: 0 | Status: SHIPPED | OUTPATIENT
Start: 2022-08-26 | End: 2022-09-15 | Stop reason: ALTCHOICE

## 2022-08-26 ASSESSMENT — ENCOUNTER SYMPTOMS
FACIAL SWELLING: 0
ABDOMINAL PAIN: 0
COLOR CHANGE: 0
RHINORRHEA: 0
STRIDOR: 0
EYE REDNESS: 0
VOMITING: 0
CHOKING: 0
WHEEZING: 0
SHORTNESS OF BREATH: 0
CHEST TIGHTNESS: 0
DIARRHEA: 0
TROUBLE SWALLOWING: 0
CONSTIPATION: 0
COUGH: 0
NAUSEA: 0
EYE PAIN: 0
EYE DISCHARGE: 0
SORE THROAT: 0

## 2022-08-26 ASSESSMENT — VISUAL ACUITY: OU: 1

## 2022-08-26 NOTE — PROGRESS NOTES
Subjective:      Chief Complaint   Patient presents with    Edema     HPI:  Radha Wright is a 31 y.o. male who presents today feeling much better, has had a lot less swelling. Denies fever, warmth, erythema, dizziness. Past Medical History:   Diagnosis Date    Anxiety     \"About Surgery\"    Asthma     \"As a child, no problems since then\"    Chronic back pain     Foot callus     \"Bilateral\"    Headache(784.0)     \"Use to have intense headaches\"    Meningitis spinal  or     Phobia     \"Phobia Of Needles\"    Shortness of breath on exertion       Past Surgical History:   Procedure Laterality Date    KNEE ARTHROSCOPY  [id number]    rt knee lateral menisectomy    OTHER SURGICAL HISTORY      \"Removal Inhaled  Paper Clips From Tonsils\"     Social History     Tobacco Use    Smoking status: Former     Packs/day: 1.00     Years: 10.00     Pack years: 10.00     Types: Cigarettes     Quit date: 2022     Years since quittin.4    Smokeless tobacco: Former     Quit date: 6/15/2002    Tobacco comments: \"Occ chewing tobacco when quit in \"   Substance Use Topics    Alcohol use: Yes     Comment: \"Occ\"      Review of Systems   Constitutional:  Negative for activity change, appetite change, chills, diaphoresis, fatigue, fever and unexpected weight change. HENT:  Negative for congestion, facial swelling, rhinorrhea, sore throat and trouble swallowing. Eyes:  Negative for pain, discharge, redness and visual disturbance. Respiratory:  Negative for cough, choking, chest tightness, shortness of breath, wheezing and stridor. Cardiovascular:  Positive for leg swelling. Negative for chest pain and palpitations. Gastrointestinal:  Negative for abdominal pain, constipation, diarrhea, nausea and vomiting. Genitourinary:  Negative for difficulty urinating, dysuria, flank pain and hematuria. Musculoskeletal:  Negative for gait problem and myalgias. Skin:  Negative for color change and pallor. Neurological:  Negative for dizziness, syncope, weakness, light-headedness, numbness and headaches. Psychiatric/Behavioral:  Negative for agitation, behavioral problems and decreased concentration. Prior to Visit Medications    Medication Sig Taking? Authorizing Provider   furosemide (LASIX) 20 MG tablet Take 1 tablet by mouth in the morning for 14 days. Dereje Quach APRN - CNP   metFORMIN (GLUCOPHAGE-XR) 500 MG extended release tablet Take 1 tablet by mouth 2 times daily (with meals)  Snow Morse MD   fluticasone-salmeterol (ADVAIR DISKUS) 250-50 MCG/ACT AEPB diskus inhaler Inhale 1 puff into the lungs every 12 hours  Snow Morse MD   lisinopril (PRINIVIL;ZESTRIL) 40 MG tablet Take 1 tablet by mouth daily  Snow Morse MD   glucose monitoring (FREESTYLE FREEDOM) kit 1 kit by Does not apply route daily  Snow Morse MD   Lancets MISC 1 each by Does not apply route daily  Snow Morse MD   blood glucose monitor strips Test 1 times a day & as needed for symptoms of irregular blood glucose. Dispense sufficient amount for indicated testing frequency plus additional to accommodate PRN testing needs. Snow Morse MD   sertraline (ZOLOFT) 50 MG tablet Take 1 tablet by mouth daily  Snow Morse MD   albuterol sulfate HFA (VENTOLIN HFA) 108 (90 Base) MCG/ACT inhaler Inhale 2 puffs into the lungs every 4 hours as needed for Wheezing or Shortness of Breath  Snow Morse MD        Objective:      /80   Pulse (!) 108   Wt (!) 343 lb (155.6 kg)   SpO2 95%   BMI 44.04 kg/m²    Physical Exam  Vitals reviewed. Constitutional:       General: He is awake. He is not in acute distress. Appearance: Normal appearance. He is well-developed. He is not ill-appearing or toxic-appearing. HENT:      Head: Normocephalic. Jaw: There is normal jaw occlusion.       Right Ear: External ear normal.      Left Ear: External ear normal.      Nose: Nose normal.      Mouth/Throat:      Mouth: Mucous membranes are moist.      Pharynx: Oropharynx is clear. Eyes:      General: Lids are normal. Vision grossly intact. Gaze aligned appropriately. Extraocular Movements: Extraocular movements intact. Conjunctiva/sclera: Conjunctivae normal.      Pupils: Pupils are equal, round, and reactive to light. Cardiovascular:      Rate and Rhythm: Normal rate and regular rhythm. Pulses: Normal pulses. Heart sounds: Normal heart sounds, S1 normal and S2 normal.   Pulmonary:      Effort: Pulmonary effort is normal. No respiratory distress. Breath sounds: Normal breath sounds. Abdominal:      General: Bowel sounds are normal.      Palpations: Abdomen is soft. Tenderness: There is no abdominal tenderness. There is no right CVA tenderness, left CVA tenderness, guarding or rebound. Musculoskeletal:         General: Normal range of motion. Cervical back: Normal range of motion. Right lower le+ Edema present. Left lower le+ Edema present. Skin:     General: Skin is warm and dry. Capillary Refill: Capillary refill takes less than 2 seconds. Neurological:      General: No focal deficit present. Mental Status: He is alert and oriented to person, place, and time. Mental status is at baseline. GCS: GCS eye subscore is 4. GCS verbal subscore is 5. GCS motor subscore is 6. Cranial Nerves: Cranial nerves are intact. No cranial nerve deficit. Sensory: Sensation is intact. No sensory deficit. Motor: Motor function is intact. Coordination: Coordination is intact. Gait: Gait is intact. Psychiatric:         Mood and Affect: Mood and affect normal.         Speech: Speech normal.         Behavior: Behavior normal. Behavior is cooperative. Thought Content:  Thought content normal.         Cognition and Memory: Cognition and memory normal.         Judgment: Judgment normal.        Assessment / Plan:        1. Mild peripheral edema  Patient has had 17 lb weight loss in last 2 weeks and reports relief. Has very mild lower extremity today and less skin sensitivity. Patient attempted to use compression stockings at work this week and reports they were too uncomfortable by the end of the day. Discussed treatment options and clinical decision to continue low dose of daily Lasix for patient. Discussed risk vs benefit. Discussed possibility of coming off in the future with better control of co-morbidities. Questions answered at time of appointment and patient agrees with plan of care. - furosemide (LASIX) 20 MG tablet; Take 1 tablet by mouth daily  Dispense: 90 tablet; Refill: 0    2. Essential hypertension  Patient hasn't been able to locate home BP cuff to keep track of blood pressure, new kit ordered today. - Blood Pressure KIT; Check blood pressure daily  Dispense: 1 kit; Refill: 0    3. Type 2 diabetes mellitus without complication, without long-term current use of insulin ([facility] [address]  Patient curious if lifestyle changes have impacted A1C, patient has gone from 8.6 in June to 6.6 today. Patient is tearful in the office and encouraged by lab results. Encouraged patient to keep up the good work. - POCT glycosylated hemoglobin (Hb A1C)        I have spent 25 minutes on this patient encounter. Patient voiced understanding and agreement with plan. All questions/concerns were addressed, risks/side effects of medications were reviewed. Return precautions and after visit summary were provided. Return if symptoms worsen or fail to improve. or earlier as needed. Please note this report has been produced using speech recognition software and may contain errors related to that system including errors in grammar, punctuation, and spelling, as well as words and phrases that may be inappropriate.  If there are any questions or concerns please feel free to contact the dictating provider for clarification.     Dereje Quach, APRN - CNP

## 2022-09-15 ENCOUNTER — OFFICE VISIT (OUTPATIENT)
Dept: INTERNAL MEDICINE CLINIC | Age: 37
End: 2022-09-15
Payer: COMMERCIAL

## 2022-09-15 VITALS
SYSTOLIC BLOOD PRESSURE: 130 MMHG | HEART RATE: 107 BPM | DIASTOLIC BLOOD PRESSURE: 98 MMHG | OXYGEN SATURATION: 97 % | BODY MASS INDEX: 40.43 KG/M2 | HEIGHT: 74 IN | WEIGHT: 315 LBS

## 2022-09-15 DIAGNOSIS — I10 ESSENTIAL HYPERTENSION: Primary | ICD-10-CM

## 2022-09-15 DIAGNOSIS — E78.5 HYPERLIPIDEMIA, UNSPECIFIED HYPERLIPIDEMIA TYPE: ICD-10-CM

## 2022-09-15 DIAGNOSIS — M25.571 ACUTE RIGHT ANKLE PAIN: ICD-10-CM

## 2022-09-15 DIAGNOSIS — R60.0 MILD PERIPHERAL EDEMA: ICD-10-CM

## 2022-09-15 DIAGNOSIS — E03.9 ACQUIRED HYPOTHYROIDISM: ICD-10-CM

## 2022-09-15 DIAGNOSIS — J45.40 MODERATE PERSISTENT ASTHMA WITHOUT COMPLICATION: ICD-10-CM

## 2022-09-15 DIAGNOSIS — E11.9 TYPE 2 DIABETES MELLITUS WITHOUT COMPLICATION, WITHOUT LONG-TERM CURRENT USE OF INSULIN (HCC): ICD-10-CM

## 2022-09-15 PROCEDURE — 99214 OFFICE O/P EST MOD 30 MIN: CPT | Performed by: FAMILY MEDICINE

## 2022-09-15 PROCEDURE — 3044F HG A1C LEVEL LT 7.0%: CPT | Performed by: FAMILY MEDICINE

## 2022-09-15 RX ORDER — FLUTICASONE PROPIONATE AND SALMETEROL 500; 50 UG/1; UG/1
1 POWDER RESPIRATORY (INHALATION) EVERY 12 HOURS
Qty: 60 EACH | Refills: 3 | Status: SHIPPED | OUTPATIENT
Start: 2022-09-15

## 2022-09-15 RX ORDER — PREDNISONE 5 MG/1
TABLET ORAL
COMMUNITY

## 2022-09-15 RX ORDER — HYDROCHLOROTHIAZIDE 12.5 MG/1
12.5 CAPSULE, GELATIN COATED ORAL EVERY MORNING
Qty: 30 CAPSULE | Refills: 3 | Status: SHIPPED | OUTPATIENT
Start: 2022-09-15

## 2022-09-15 ASSESSMENT — ENCOUNTER SYMPTOMS
CHEST TIGHTNESS: 0
VOMITING: 0
SHORTNESS OF BREATH: 1
ABDOMINAL PAIN: 0
DIARRHEA: 0
CONSTIPATION: 0
SORE THROAT: 0
COLOR CHANGE: 0

## 2022-09-15 NOTE — PATIENT INSTRUCTIONS
We are committed to providing you the best care possible. If you receive a survey after visiting one of our offices, please take time to share your experience concerning your physician office visit. These surveys are confidential and no health information about you is shared. We are eager to improve for you and we are counting on your feedback to help make that happen. Start taking advair (I'm increasing your dose) 1 puff in the morning and 1 puff at night. Stop lasix and start hydrochlorothiazide once a day and start monitoring your blood pressures at home. Keep a log and bring it to your next appointment. Start wearing compression stockings and elevate your legs while sitting. We will repeat labs in 3 months.

## 2022-09-15 NOTE — PROGRESS NOTES
Subjective:      Chief Complaint   Patient presents with    3 Month Follow-Up    Diabetes    Hypertension    Ankle Pain     Right - went to Urgent Care - has Plantar Fasciitis was given steroids - 1/2 dose. Pain increased today and started taking the other 1/2. HPI:  Cherrie Pradhan is a [address] y.o. male who presents today for follow up of chronic conditions as listed below. DM:  Was diagnosed with DM at his last appointment and started on metformin. Also referred to diabetic educator. States he stopped drinking soda since his last appointment and has lost almost 20 lbs. States he has been drinking more water instead, feels much better overall. States he does have more frequent bowel movements (with metformin), but is manageable. Did notice some swelling in his ankles last month and was started on lasix by NP, which has been helping. Does have compression stockings, but does not use. Has not been elevating legs. HTN:  Has been taking lisinopril 40mg daily. Has not been checking BP's at home, but does have a cuff. Asthma:  Was started on advair several months ago- dose was increased at last appointment. States he has only been taking once daily. Is still taking albuterol \"20 times a day. \"  States he gets winded very easily (reports this has always been the case) and that his symptoms do improve with albuterol. Also states his R ankle has been hurting for a few months. No known injury. Went to urgent care and was prescribed antibiotics and a steroid pack. Was told he had plantar fasciitis, no imaging done. Was told to only take 1/2 the pack due to his DM, and symptoms did improve with steroids. States he restarted yesterday as symptoms started recurring.         Past Medical History:   Diagnosis Date    Anxiety     \"About Surgery\"    Asthma     \"As a child, no problems since then\"    Chronic back pain     Foot callus     \"Bilateral\"    Headache(784.0)     \"Use to have intense headaches\"    Meningitis spinal  or     Phobia     \"Phobia Of Needles\"    Shortness of breath on exertion         Past Surgical History:   Procedure Laterality Date    KNEE ARTHROSCOPY  [id number]    rt knee lateral menisectomy    OTHER SURGICAL HISTORY      \"Removal Inhaled  Paper Clips From Tonsils\"       Social History     Tobacco Use    Smoking status: Former     Packs/day: 1.00     Years: 10.00     Pack years: 10.00     Types: Cigarettes     Quit date: 2022     Years since quittin.5    Smokeless tobacco: Former     Quit date: 6/15/2002    Tobacco comments: \"Occ chewing tobacco when quit in \"   Substance Use Topics    Alcohol use: Yes     Comment: \"Occ\"        Review of Systems   Constitutional:  Negative for activity change, appetite change, chills, fever and unexpected weight change. HENT:  Negative for congestion and sore throat. Respiratory:  Positive for shortness of breath. Negative for chest tightness. Cardiovascular:  Positive for leg swelling. Negative for chest pain and palpitations. Gastrointestinal:  Negative for abdominal pain, constipation, diarrhea and vomiting. Genitourinary:  Negative for dysuria. Musculoskeletal:  Positive for arthralgias. Skin:  Negative for color change. Neurological:  Negative for dizziness and light-headedness. Psychiatric/Behavioral:  Negative for dysphoric mood. The patient is not nervous/anxious. Prior to Visit Medications    Medication Sig Taking?  Authorizing Provider   predniSONE 5 MG (21) TBPK Take by mouth Titrate down Yes Historical Provider, MD   Blood Pressure KIT Check blood pressure daily Yes LIZ Patterson CNP   furosemide (LASIX) 20 MG tablet Take 1 tablet by mouth daily Yes LIZ Patterson CNP   metFORMIN (GLUCOPHAGE-XR) 500 MG extended release tablet Take 1 tablet by mouth 2 times daily (with meals) Yes Godfrey Self MD   fluticasone-salmeterol (ADVAIR DISKUS) 250-50 MCG/ACT AEPB diskus inhaler Inhale 1 puff into the lungs every 12 hours Yes Yoli Morton MD   lisinopril (PRINIVIL;ZESTRIL) 40 MG tablet Take 1 tablet by mouth daily Yes Yoli Morton MD   glucose monitoring (FREESTYLE FREEDOM) kit 1 kit by Does not apply route daily Yes Yoli Morton MD   Lancets MISC 1 each by Does not apply route daily Yes Yoli Morton MD   blood glucose monitor strips Test 1 times a day & as needed for symptoms of irregular blood glucose. Dispense sufficient amount for indicated testing frequency plus additional to accommodate PRN testing needs. Yes Yoli Morton MD   sertraline (ZOLOFT) 50 MG tablet Take 1 tablet by mouth daily Yes Yoli Morton MD   albuterol sulfate HFA (VENTOLIN HFA) 108 (90 Base) MCG/ACT inhaler Inhale 2 puffs into the lungs every 4 hours as needed for Wheezing or Shortness of Breath Yes oYli Morton MD          Objective:      BP (!) 130/98 (Site: Right Upper Arm, Position: Sitting, Cuff Size: Large Adult)   Pulse (!) 107   Ht 6' 2\" (1.88 m)   Wt (!) 339 lb (153.8 kg)   SpO2 97%   BMI 43.53 kg/m²      Physical Exam  Vitals and nursing note reviewed. Constitutional:       General: He is not in acute distress. Appearance: Normal appearance. He is obese. He is not ill-appearing or toxic-appearing. HENT:      Head: Normocephalic and atraumatic. Right Ear: External ear normal.      Left Ear: External ear normal.      Nose: Nose normal.      Mouth/Throat:      Pharynx: Oropharynx is clear. Eyes:      General: No scleral icterus. Right eye: No discharge. Left eye: No discharge. Extraocular Movements: Extraocular movements intact. Conjunctiva/sclera: Conjunctivae normal.   Cardiovascular:      Rate and Rhythm: Normal rate and regular rhythm. Heart sounds: Normal heart sounds. Pulmonary:      Effort: Pulmonary effort is normal.      Breath sounds: Normal breath sounds. No wheezing or rales. Musculoskeletal:         General: No deformity. Cervical back: Normal range of motion and neck supple. No rigidity. Skin:     General: Skin is warm and dry. Findings: No rash. Neurological:      General: No focal deficit present. Mental Status: He is alert. Mental status is at baseline. Motor: No weakness. Psychiatric:         Mood and Affect: Mood normal.         Behavior: Behavior normal.          Assessment / Plan:      1. Essential hypertension  Uncontrolled. Will try switching from lasix to HCTZ, advised to start monitoring at home and bring log to follow up. - hydroCHLOROthiazide (MICROZIDE) 12.5 MG capsule; Take 1 capsule by mouth every morning  Dispense: 30 capsule; Refill: 3  - CBC with Auto Differential; Future  - Comprehensive Metabolic Panel; Future    2. Type 2 diabetes mellitus without complication, without long-term current use of insulin ([address]  Now well controlled with dietary modifications and starting metformin. Will continue and repeat labs in 3 months.   - Hemoglobin A1C; Future    3. Acquired hypothyroidism  TSH still wnl off of synthroid, will monitor.   - TSH; Future    4. Hyperlipidemia, unspecified hyperlipidemia type  Will monitor.   - Lipid Panel; Future    5. Moderate persistent asthma without complication  Poor controlled. Will increase dose of advair- also instructed to take BID as prescribed. If symptoms still not improving with medication change, will either refer to pulmonology or get further evaluation.    - fluticasone-salmeterol (ADVAIR DISKUS) 500-50 MCG/ACT AEPB diskus inhaler; Inhale 1 puff into the lungs in the morning and 1 puff in the evening. Dispense: 60 each; Refill: 3    6. Acute right ankle pain  Will check XR. Ice, NSAIDs as needed. - XR ANKLE RIGHT (MIN 3 VIEWS); Future    7. Mild peripheral edema  Instructed to start wearing compression stockings, elevate legs. Will start HCTZ.   If symptoms not improving, will plan on evaluating further at follow up. I have spent 40 minutes on this patient encounter. Patient voiced understanding and agreement with plan. All questions/concerns were addressed, risks/side effects of medications were reviewed. Return precautions and after visit summary were provided. Return in about 3 months (around 12/15/2022). or earlier as needed.       Rigo Lindsey MD

## 2022-10-07 DIAGNOSIS — E11.9 TYPE 2 DIABETES MELLITUS WITHOUT COMPLICATION, WITHOUT LONG-TERM CURRENT USE OF INSULIN (HCC): ICD-10-CM

## 2022-10-11 RX ORDER — BLOOD SUGAR DIAGNOSTIC
STRIP MISCELLANEOUS
Qty: 100 STRIP | Refills: 1 | Status: SHIPPED | OUTPATIENT
Start: 2022-10-11

## 2022-12-15 ENCOUNTER — OFFICE VISIT (OUTPATIENT)
Dept: INTERNAL MEDICINE CLINIC | Age: 37
End: 2022-12-15
Payer: COMMERCIAL

## 2022-12-15 VITALS
SYSTOLIC BLOOD PRESSURE: 132 MMHG | HEART RATE: 117 BPM | BODY MASS INDEX: 40.43 KG/M2 | OXYGEN SATURATION: 96 % | DIASTOLIC BLOOD PRESSURE: 90 MMHG | HEIGHT: 74 IN | WEIGHT: 315 LBS

## 2022-12-15 DIAGNOSIS — J45.40 MODERATE PERSISTENT ASTHMA WITHOUT COMPLICATION: ICD-10-CM

## 2022-12-15 DIAGNOSIS — R60.9 SWELLING: Primary | ICD-10-CM

## 2022-12-15 DIAGNOSIS — E03.9 ACQUIRED HYPOTHYROIDISM: ICD-10-CM

## 2022-12-15 DIAGNOSIS — E11.9 TYPE 2 DIABETES MELLITUS WITHOUT COMPLICATION, WITHOUT LONG-TERM CURRENT USE OF INSULIN (HCC): ICD-10-CM

## 2022-12-15 DIAGNOSIS — R06.02 SHORTNESS OF BREATH: ICD-10-CM

## 2022-12-15 DIAGNOSIS — F41.9 ANXIETY: ICD-10-CM

## 2022-12-15 DIAGNOSIS — I10 ESSENTIAL HYPERTENSION: ICD-10-CM

## 2022-12-15 DIAGNOSIS — E78.5 HYPERLIPIDEMIA, UNSPECIFIED HYPERLIPIDEMIA TYPE: ICD-10-CM

## 2022-12-15 LAB
A/G RATIO: 1.1 (ref 1.1–2.2)
ALBUMIN SERPL-MCNC: 3.7 G/DL (ref 3.4–5)
ALP BLD-CCNC: 124 U/L (ref 40–129)
ALT SERPL-CCNC: 15 U/L (ref 10–40)
ANION GAP SERPL CALCULATED.3IONS-SCNC: 11 MMOL/L (ref 3–16)
AST SERPL-CCNC: 27 U/L (ref 15–37)
BASOPHILS ABSOLUTE: 0.1 K/UL (ref 0–0.2)
BASOPHILS RELATIVE PERCENT: 0.9 %
BILIRUB SERPL-MCNC: 1.9 MG/DL (ref 0–1)
BUN BLDV-MCNC: 18 MG/DL (ref 7–20)
CALCIUM SERPL-MCNC: 8.9 MG/DL (ref 8.3–10.6)
CHLORIDE BLD-SCNC: 98 MMOL/L (ref 99–110)
CO2: 29 MMOL/L (ref 21–32)
CREAT SERPL-MCNC: 0.9 MG/DL (ref 0.9–1.3)
EOSINOPHILS ABSOLUTE: 0.1 K/UL (ref 0–0.6)
EOSINOPHILS RELATIVE PERCENT: 2.1 %
GFR SERPL CREATININE-BSD FRML MDRD: >60 ML/MIN/{1.73_M2}
GLUCOSE BLD-MCNC: 126 MG/DL (ref 70–99)
HCT VFR BLD CALC: 37.4 % (ref 40.5–52.5)
HEMOGLOBIN: 11.7 G/DL (ref 13.5–17.5)
LYMPHOCYTES ABSOLUTE: 1.1 K/UL (ref 1–5.1)
LYMPHOCYTES RELATIVE PERCENT: 19.2 %
MCH RBC QN AUTO: 28.3 PG (ref 26–34)
MCHC RBC AUTO-ENTMCNC: 31.3 G/DL (ref 31–36)
MCV RBC AUTO: 90.5 FL (ref 80–100)
MONOCYTES ABSOLUTE: 0.5 K/UL (ref 0–1.3)
MONOCYTES RELATIVE PERCENT: 8.2 %
NEUTROPHILS ABSOLUTE: 4.2 K/UL (ref 1.7–7.7)
NEUTROPHILS RELATIVE PERCENT: 69.6 %
PDW BLD-RTO: 18.9 % (ref 12.4–15.4)
PLATELET # BLD: 160 K/UL (ref 135–450)
PMV BLD AUTO: 10.4 FL (ref 5–10.5)
POTASSIUM SERPL-SCNC: 4.8 MMOL/L (ref 3.5–5.1)
PRO-BNP: 4725 PG/ML (ref 0–124)
RBC # BLD: 4.14 M/UL (ref 4.2–5.9)
SODIUM BLD-SCNC: 138 MMOL/L (ref 136–145)
TOTAL PROTEIN: 7.1 G/DL (ref 6.4–8.2)
TSH SERPL DL<=0.05 MIU/L-ACNC: 3.37 UIU/ML (ref 0.27–4.2)
WBC # BLD: 6 K/UL (ref 4–11)

## 2022-12-15 PROCEDURE — 81003 URINALYSIS AUTO W/O SCOPE: CPT | Performed by: FAMILY MEDICINE

## 2022-12-15 PROCEDURE — 3078F DIAST BP <80 MM HG: CPT | Performed by: FAMILY MEDICINE

## 2022-12-15 PROCEDURE — 99214 OFFICE O/P EST MOD 30 MIN: CPT | Performed by: FAMILY MEDICINE

## 2022-12-15 PROCEDURE — 3074F SYST BP LT 130 MM HG: CPT | Performed by: FAMILY MEDICINE

## 2022-12-15 PROCEDURE — 3044F HG A1C LEVEL LT 7.0%: CPT | Performed by: FAMILY MEDICINE

## 2022-12-15 RX ORDER — ALBUTEROL SULFATE 90 UG/1
2 AEROSOL, METERED RESPIRATORY (INHALATION) EVERY 4 HOURS PRN
Qty: 2 EACH | Refills: 3 | Status: SHIPPED | OUTPATIENT
Start: 2022-12-15

## 2022-12-15 RX ORDER — SERTRALINE HYDROCHLORIDE 100 MG/1
100 TABLET, FILM COATED ORAL DAILY
Qty: 30 TABLET | Refills: 3 | Status: SHIPPED | OUTPATIENT
Start: 2022-12-15

## 2022-12-15 RX ORDER — FUROSEMIDE 40 MG/1
40 TABLET ORAL DAILY
Qty: 30 TABLET | Refills: 1 | Status: SHIPPED | OUTPATIENT
Start: 2022-12-15

## 2022-12-15 ASSESSMENT — ENCOUNTER SYMPTOMS
DIARRHEA: 0
CONSTIPATION: 0
SHORTNESS OF BREATH: 1
ABDOMINAL PAIN: 0
VOMITING: 0
COLOR CHANGE: 0
CHEST TIGHTNESS: 0
SORE THROAT: 0

## 2022-12-15 NOTE — PROGRESS NOTES
Asthma     \"As a child, no problems since then\"    Chronic back pain     Foot callus     \"Bilateral\"    Headache(784.0)     \"Use to have intense headaches\"    Meningitis spinal  or     Phobia     \"Phobia Of Needles\"    Shortness of breath on exertion         Past Surgical History:   Procedure Laterality Date    KNEE ARTHROSCOPY  [id number]    rt knee lateral menisectomy    OTHER SURGICAL HISTORY      \"Removal Inhaled  Paper Clips From Tonsils\"       Social History     Tobacco Use    Smoking status: Former     Packs/day: 3.00     Years: 22.00     Pack years: 66.00     Types: Cigarettes     Start date:      Quit date: 2022     Years since quittin.7    Smokeless tobacco: Former     Quit date: 6/15/2002    Tobacco comments: \"Occ chewing tobacco when quit in \"   Substance Use Topics    Alcohol use: Yes     Comment: \"Occ\"        Review of Systems   Constitutional:  Positive for unexpected weight change. Negative for activity change, appetite change, chills and fever. HENT:  Negative for congestion and sore throat. Respiratory:  Positive for shortness of breath. Negative for chest tightness. Cardiovascular:  Positive for leg swelling. Negative for chest pain and palpitations. Gastrointestinal:  Negative for abdominal pain, constipation, diarrhea and vomiting. Genitourinary:  Negative for dysuria. Skin:  Negative for color change. Neurological:  Negative for dizziness and light-headedness. Psychiatric/Behavioral:  Negative for dysphoric mood. The patient is nervous/anxious. Prior to Visit Medications    Medication Sig Taking? Authorizing Provider   blood glucose test strips (PRODIGY NO CODING BLOOD GLUC) strip TEST BLOOD GLUCOSE ONCE A DAY & AS NEEDED FOR SYMPTOMS OF IRREGULAR BLOOD GLUCOSE.  Yes Brandon Tian MD   predniSONE 5 MG (21) TBPK Take by mouth Titrate down Yes Historical Provider, MD   hydroCHLOROthiazide (MICROZIDE) 12.5 MG capsule Take 1 capsule by mouth every morning Yes Tobi Aquino MD   Blood Pressure KIT Check blood pressure daily Yes LIZ Agarwal CNP   metFORMIN (GLUCOPHAGE-XR) 500 MG extended release tablet Take 1 tablet by mouth 2 times daily (with meals) Yes Tobi Aquino MD   lisinopril (PRINIVIL;ZESTRIL) 40 MG tablet Take 1 tablet by mouth daily Yes Tobi Aquino MD   glucose monitoring (FREESTYLE FREEDOM) kit 1 kit by Does not apply route daily Yes Tobi Aquino MD   Lancets MISC 1 each by Does not apply route daily Yes Tobi Aquino MD   sertraline (ZOLOFT) 50 MG tablet Take 1 tablet by mouth daily Yes Tobi Aquino MD   albuterol sulfate HFA (VENTOLIN HFA) 108 (90 Base) MCG/ACT inhaler Inhale 2 puffs into the lungs every 4 hours as needed for Wheezing or Shortness of Breath Yes Tobi Aquino MD   fluticasone-salmeterol (ADVAIR DISKUS) 500-50 MCG/ACT AEPB diskus inhaler Inhale 1 puff into the lungs in the morning and 1 puff in the evening. Patient not taking: Reported on 12/15/2022  Tobi Aquino MD          Objective:      BP (!) 132/90   Pulse (!) 117   Ht 6' 2\" (1.88 m)   Wt (!) 386 lb (175.1 kg)   SpO2 96%   BMI 49.56 kg/m²      Physical Exam  Vitals and nursing note reviewed. Constitutional:       General: He is not in acute distress. Appearance: Normal appearance. He is not ill-appearing or toxic-appearing. HENT:      Head: Normocephalic and atraumatic. Right Ear: External ear normal.      Left Ear: External ear normal.      Nose: Nose normal.      Mouth/Throat:      Pharynx: Oropharynx is clear. Eyes:      General: No scleral icterus. Right eye: No discharge. Left eye: No discharge. Extraocular Movements: Extraocular movements intact. Conjunctiva/sclera: Conjunctivae normal.   Cardiovascular:      Rate and Rhythm: Normal rate and regular rhythm. Heart sounds: Normal heart sounds.    Pulmonary:      Effort: Pulmonary effort is normal.      Breath sounds: Normal breath sounds. No wheezing or rales. Abdominal:      General: There is distension. Musculoskeletal:         General: No deformity. Cervical back: Normal range of motion and neck supple. No rigidity. Right lower leg: Edema present. Left lower leg: Edema present. Skin:     General: Skin is warm and dry. Findings: No rash. Neurological:      General: No focal deficit present. Mental Status: He is alert. Mental status is at baseline. Motor: No weakness. Psychiatric:         Behavior: Behavior normal.      Comments: Tearful, anxious          Assessment / Plan:      1. Swelling  2 month history of gradual but significant swelling diffusely, has gained 40+ lbs since his last appointment. Etiology unclear, but concern for CHF vs. nephropathy vs. other. Clinically stable on exam today. Will refer to cardiology. Does also have history of hypothyroidism, but has not required treatment recently. Labs obtained today, will check CXR and increase lasix dose to 40mg until he can see cardiology. Advised compression stockins, elevation, exercise as tolerated. Follow up in 2 weeks, report to ER for any acutely worsening symptoms.    - Comprehensive Metabolic Panel  - TSH  - XR CHEST STANDARD (2 VW); Future  - [id number] [facility]  - furosemide (LASIX) 40 MG tablet; Take 1 tablet by mouth daily  Dispense: 30 tablet; Refill: 1  - Urinalysis  - Brain Natriuretic Peptide    2. Shortness of breath  Chronic, but worsening. Suspect multifactorial with asthma, COPD and fluid overload all contributing. Will escalate to triple therapy as patient has not responded to ICS. Also increasing dose of lasix. Will check CXR and refer to pulmonology as well. Report to ER for any acutely worsening symptoms. - XR CHEST STANDARD (2 VW);  Future  - [address] [facility], [address] [facility] ([place]) 100-62.5-25 MCG/ACT AEPB inhaler; Inhale 1 puff into the lungs daily  Dispense: 1 each; Refill: 1  - albuterol sulfate HFA (VENTOLIN HFA) 108 (90 Base) MCG/ACT inhaler; Inhale 2 puffs into the lungs every 4 hours as needed for Wheezing or Shortness of Breath  Dispense: 2 each; Refill: 3  - furosemide (LASIX) 40 MG tablet; Take 1 tablet by mouth daily  Dispense: 30 tablet; Refill: 1    3. Type 2 diabetes mellitus without complication, without long-term current use of insulin (HCC)  Now well controlled since being started on metformin- will continue for now. - Hemoglobin A1C    4. Essential hypertension  Borderline BP today. Increasing lasix dose, will continue current dose of lisinopril for now and follow up in 2 weeks. - CBC with Auto Differential  - Comprehensive Metabolic Panel    5. Acquired hypothyroidism  Has not required medication in years. Given severity of edema, will recheck labs. - TSH    6. Hyperlipidemia, unspecified hyperlipidemia type  Will continue to monitor. 7. Moderate persistent asthma without complication  Patient with very poorly controlled asthma that has not responded to high dose ICS. Suspect he also has undiagnosed COPD given smoking history. Will try escalating to triple therapy, continue albuterol as needed. Will also refer to pulmonology. - fluticasone-umeclidin-vilant (TRELEGY ELLIPTA) 100-62.5-25 MCG/ACT AEPB inhaler; Inhale 1 puff into the lungs daily  Dispense: 1 each; Refill: 1  - albuterol sulfate HFA (VENTOLIN HFA) 108 (90 Base) MCG/ACT inhaler; Inhale 2 puffs into the lungs every 4 hours as needed for Wheezing or Shortness of Breath  Dispense: 2 each; Refill: 3    8. Anxiety  Poorly controlled at baseline, worsening with current symptoms. Will increase zoloft dose to 100mg daily. - sertraline (ZOLOFT) 100 MG tablet; Take 1 tablet by mouth daily  Dispense: 30 tablet; Refill: 3        I have spent 45 minutes on this patient encounter.      Patient voiced understanding and agreement with plan. All questions/concerns were addressed, risks/side effects of medications were reviewed. Return precautions and after visit summary were provided. Return in about 2 weeks (around 12/29/2022). or earlier as needed.       Brice Wooten MD

## 2022-12-16 LAB
ESTIMATED AVERAGE GLUCOSE: 142.7 MG/DL
HBA1C MFR BLD: 6.6 %

## 2022-12-21 ENCOUNTER — HOSPITAL ENCOUNTER (OUTPATIENT)
Dept: GENERAL RADIOLOGY | Age: 37
Discharge: HOME | End: 2022-12-21
Payer: COMMERCIAL

## 2022-12-21 ENCOUNTER — INITIAL CONSULT (OUTPATIENT)
Dept: CARDIOLOGY CLINIC | Age: 37
End: 2022-12-21
Payer: COMMERCIAL

## 2022-12-21 ENCOUNTER — HOSPITAL ENCOUNTER (OUTPATIENT)
Age: 37
Discharge: HOME | End: 2022-12-21
Payer: COMMERCIAL

## 2022-12-21 VITALS
BODY MASS INDEX: 40.43 KG/M2 | DIASTOLIC BLOOD PRESSURE: 82 MMHG | SYSTOLIC BLOOD PRESSURE: 110 MMHG | WEIGHT: 315 LBS | HEART RATE: 101 BPM | HEIGHT: 74 IN

## 2022-12-21 DIAGNOSIS — R06.02 SHORTNESS OF BREATH: ICD-10-CM

## 2022-12-21 DIAGNOSIS — G47.33 OBSTRUCTIVE SLEEP APNEA: ICD-10-CM

## 2022-12-21 DIAGNOSIS — R60.9 SWELLING: ICD-10-CM

## 2022-12-21 DIAGNOSIS — R60.0 EDEMA OF LOWER EXTREMITY: ICD-10-CM

## 2022-12-21 DIAGNOSIS — E66.01 CLASS 3 SEVERE OBESITY DUE TO EXCESS CALORIES WITHOUT SERIOUS COMORBIDITY WITH BODY MASS INDEX (BMI) OF 45.0 TO 49.9 IN ADULT (HCC): ICD-10-CM

## 2022-12-21 DIAGNOSIS — Z72.0 NICOTINE ABUSE: ICD-10-CM

## 2022-12-21 DIAGNOSIS — I10 ESSENTIAL HYPERTENSION: ICD-10-CM

## 2022-12-21 DIAGNOSIS — R60.9 EDEMA, UNSPECIFIED TYPE: Primary | ICD-10-CM

## 2022-12-21 PROCEDURE — 93000 ELECTROCARDIOGRAM COMPLETE: CPT | Performed by: INTERNAL MEDICINE

## 2022-12-21 PROCEDURE — 71046 X-RAY EXAM CHEST 2 VIEWS: CPT

## 2022-12-21 PROCEDURE — 99204 OFFICE O/P NEW MOD 45 MIN: CPT | Performed by: INTERNAL MEDICINE

## 2022-12-21 PROCEDURE — 3074F SYST BP LT 130 MM HG: CPT | Performed by: INTERNAL MEDICINE

## 2022-12-21 PROCEDURE — 3078F DIAST BP <80 MM HG: CPT | Performed by: INTERNAL MEDICINE

## 2022-12-21 RX ORDER — FUROSEMIDE 40 MG/1
60 TABLET ORAL DAILY
Qty: 30 TABLET | Refills: 1 | Status: SHIPPED | OUTPATIENT
Start: 2022-12-21

## 2022-12-21 NOTE — PROGRESS NOTES
Paula Card MD                                  CARDIOLOGY  NOTE           Chief Complaint:    Chief Complaint   Patient presents with    New Patient     Lucendia Belt started yrs ago cant walk across the room bilateral ankles legs and feet scrotum also swollen does not wear compression stocking started months ago    Shortness of Breath    Edema        HPI:     Duc Hassan is a 30y.o. year old male who presents to the clinic with chief complaint of shortness of breath lower extremity edema    Patient is 6 feet 2 inches and 386 pounds. Mentions that over the past several months he has been diagnosed with diabetes mellitus, has been diagnosed with lower extremity edema and is on Lasix. Patient mentions that he has been experiencing shortness of breath with little exertion as well as lower extremity edema. Denies any PND orthopnea. Patient has been previously diagnosed with sleep apnea however is noncompliant with sleep apnea  Patient is a former smoker who quit 9 months ago. Smokes marijuana 2-3 times per week. Denies any alcohol abuse. Family history is positive for premature CAD in his father who  at the age of 39 from heart attack, also was a drug abuser. Patient first cousin had congenital heart disease which was operated upon.     EKG in the office today shows sinus rhythm, sinus tachycardia    Current Outpatient Medications   Medication Sig Dispense Refill    furosemide (LASIX) 40 MG tablet Take 1.5 tablets by mouth daily 30 tablet 1    fluticasone-umeclidin-vilant (TRELEGY ELLIPTA) 100-62.5-25 MCG/ACT AEPB inhaler Inhale 1 puff into the lungs daily 1 each 1    albuterol sulfate HFA (VENTOLIN HFA) 108 (90 Base) MCG/ACT inhaler Inhale 2 puffs into the lungs every 4 hours as needed for Wheezing or Shortness of Breath 2 each 3    sertraline (ZOLOFT) 100 MG tablet Take 1 tablet by mouth daily 30 tablet 3    blood glucose test strips (PRODIGY NO CODING BLOOD GLUC) strip TEST BLOOD GLUCOSE ONCE A DAY & AS NEEDED FOR SYMPTOMS OF IRREGULAR BLOOD GLUCOSE. 100 strip 1    Blood Pressure KIT Check blood pressure daily 1 kit 0    metFORMIN (GLUCOPHAGE-XR) 500 MG extended release tablet Take 1 tablet by mouth 2 times daily (with meals) 60 tablet 3    lisinopril (PRINIVIL;ZESTRIL) 40 MG tablet Take 1 tablet by mouth daily 90 tablet 1    glucose monitoring (FREESTYLE FREEDOM) kit 1 kit by Does not apply route daily 1 kit 0    Lancets MISC 1 each by Does not apply route daily 100 each 1    hydroCHLOROthiazide (MICROZIDE) 12.5 MG capsule Take 1 capsule by mouth every morning (Patient not taking: Reported on 12/21/2022) 30 capsule 3     No current facility-administered medications for this visit. Allergies:     Patient has no known allergies.     Patient History:    Past Medical History:   Diagnosis Date    Anxiety     \"About Surgery\"    Asthma     \"As a child, no problems since then\"    Chronic back pain     Foot callus     \"Bilateral\"    Headache(784.0)     \"Use to have intense headaches\"    Meningitis spinal 1987 or 1988    Phobia     \"Phobia Of Needles\"    Shortness of breath on exertion      Past Surgical History:   Procedure Laterality Date    KNEE ARTHROSCOPY  [id number]    rt knee lateral menisectomy    OTHER SURGICAL HISTORY  1997    \"Removal Inhaled  Paper Clips From Tonsils\"     Family History   Problem Relation Age of Onset    Heart Disease Father         \"Heart Attack\"    Early Death Father 39    Substance Abuse Father         \"Drug And Alcohol Abuse\"    Other Father         \"Emphysema\"    Early Death Mother 39    Other Mother         \"Web toes on one foot\"    Substance Abuse Mother         \"Drugs And Alcohol\"    Substance Abuse Sister         \"Drugs And Alcohol\"    Cancer Sister         \"Ovarian Cancer\"    [de-identified] / Mimi Crawford Sister      Social History     Tobacco Use    Smoking status: Former     Packs/day: 3.00     Years: 22.00     Pack years: 66.00     Types: Cigarettes     Start date: 2000     Quit date: 2022     Years since quittin.8    Smokeless tobacco: Former     Quit date: 6/15/2002    Tobacco comments: \"Occ chewing tobacco when quit in \" chews nicotine gum   Substance Use Topics    Alcohol use: Yes     Comment: \"Occ\"        Review of Systems:     Constitutional:  No Fever or Weight Loss   Eyes: No Decreased Vision  ENT: No Headaches, Hearing Loss or Vertigo  Cardiovascular: No Chest Pain,  +++  Shortness of breath, No Palpitations. No Edema   Respiratory: No cough or wheezing . No Respiratory distress   Gastrointestinal: No abdominal pain, appetite loss, blood in stools, constipation, diarrhea or heartburn  Genitourinary: No dysuria, trouble voiding, or hematuria  Musculoskeletal:  denies any new  joint aches , or pain   Integumentary: No rash or pruritis  Neurological: No TIA or stroke symptoms  Psychiatric: No anxiety or depression  Endocrine: No malaise, fatigue or temperature intolerance  Hematologic/Lymphatic: No bleeding problems, blood clots or swollen lymph nodes  Allergic/Immunologic: No nasal congestion or hives        Objective:      Physical Exam:    /82 (Site: Right Upper Arm, Position: Sitting, Cuff Size: Large Adult)   Pulse (!) 101   Ht 6' 2\" (1.88 m)   Wt (!) 386 lb (175.1 kg)   BMI 49.56 kg/m²   Wt Readings from Last 3 Encounters:   22 (!) 386 lb (175.1 kg)   12/15/22 (!) 386 lb (175.1 kg)   09/15/22 (!) 339 lb (153.8 kg)     Body mass index is 49.56 kg/m². Vitals:    22 1105   BP: 110/82   Pulse: (!) 101        General Appearance and Constitutional: Conversant, Well developed, Well nourished, No acute distress, Non-toxic appearance. HEENT:  Normocephalic, Atraumatic, Bilateral external ears normal, Oropharynx moist, No oral exudates,   Nose normal.   Neck- Normal range of motion, No tenderness, Supple  Eyes:   Conjunctiva normal, No discharge. Respiratory:  Normal breath sounds, No respiratory distress, No wheezing, No Rales, No Ronchi.   No chest tenderness. Cardiovascular: S1-S2, no added heart sounds, No Mumurs appreciated. No gallops, rubs. ++  Pedal Edema   GI:  Bowel sounds normal, Soft, No tenderness,  :  No costovertebral angle tenderness   Musculoskeletal:  No gross deformities. Back- No tenderness  Integument:  Well hydrated, no rash   Lymphatic:  No lymphadenopathy noted   Neurologic:  Alert & oriented x 3, Normal motor function, normal sensory function, no focal deficits noted   Psychiatric:  Speech and behavior appropriate       Medical decision making and Data review:    DATA:    No results found for: TROPONINT  BNP:    Lab Results   Component Value Date    PROBNP 4,725 (H) 12/15/2022     PT/INR:  No results found for: PTINR  Lab Results   Component Value Date    LABA1C 6.6 12/15/2022    LABA1C 6.6 08/26/2022     Lab Results   Component Value Date    CHOL 160 07/26/2017    TRIG 369 (H) 07/26/2017    HDL 26 (L) 07/26/2017    LDLDIRECT 100 (H) 05/19/2014     Lab Results   Component Value Date    WBC 6.0 12/15/2022    HGB 11.7 (L) 12/15/2022    HCT 37.4 (L) 12/15/2022    MCV 90.5 12/15/2022     12/15/2022     TSH:   Lab Results   Component Value Date    TSH 3.37 12/15/2022     Lab Results   Component Value Date    AST 27 12/15/2022    ALT 15 12/15/2022    BILITOT 1.9 (H) 12/15/2022    ALKPHOS 124 12/15/2022         All labs, medications and tests reviewed by myself including data and history from outside source , patient and available family . 1. Edema, unspecified type    2. Shortness of breath    3. Essential hypertension    4. Class 3 severe obesity due to excess calories without serious comorbidity with body mass index (BMI) of 45.0 to 49.9 in adult ([facility] [address]    5. Edema of lower extremity    6. Obstructive sleep apnea    7. Nicotine abuse    8. Swelling         Impression and Plan:      Shortness of breath with exertion  Congestive heart failure, subtype unclear  Bilateral lower extremity edema +2    Increase Lasix to 40+20 mg p.o. daily  Restrict fluid to less than 1500 cc/day  Patient was counseled extensively to give up salt food, prefer home based food. Avoid red meat  Lose weight    Echocardiogram to rule out structural heart disease  Cardiolite stress MPI, exercise, for risk stratification  Venous ultrasound lower extremity to rule out venous insufficiency    Essential hypertension: Blood pressure stable. Continue with lisinopril 40 daily  Diabetes mellitus: Patient is on antidiabetic medications. Management primary team  Obstructive sleep apnea: Patient has been diagnosed with sleep apnea in the past however noncompliant with CPAP. He was urged to restart his CPAP. He has an appointment to see pulmonologist.    Morbid obesity with BMI of 49.56. Low-salt low-fat diet and exercise as tolerated    Former smoker      Return in about 1 month (around 1/21/2023). Counseled extensively and medication compliance urged. We discussed that for the  prevention of ASCVD our  goal is aggressive risk modification. Patient is encouraged to exercise even a brisk walk for 30 minutes  at least 3 to 4 times a week   Various goals were discussed and questions answered. Continue current medications. Appropriate prescriptions are addressed and refills ordered. Questions answered and patient verbalizes understanding. Call for any problems, questions, or concerns.

## 2022-12-21 NOTE — PROGRESS NOTES
Vein \"LEG PROBLEM Questionnaire\"  Do you have prominent leg veins? No   Do you have any skin discoloration? No  Do you have any healed or active sores? Yes  Do you have swelling of the legs? Yes  Do you have a family history of varicose veins? 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 Yes  7. Have you been fighting overweight problems? Yes  8. Do you have restless legs? No  9. Do you have any night time cramps? No  10. Do you have any of the following in your legs:         I  11. If Yes - Have they worn compression stockings No  12.  If they have worn compression stockings

## 2023-01-03 ENCOUNTER — OFFICE VISIT (OUTPATIENT)
Dept: INTERNAL MEDICINE CLINIC | Age: 38
End: 2023-01-03
Payer: COMMERCIAL

## 2023-01-03 VITALS
DIASTOLIC BLOOD PRESSURE: 80 MMHG | OXYGEN SATURATION: 94 % | HEIGHT: 74 IN | WEIGHT: 315 LBS | SYSTOLIC BLOOD PRESSURE: 130 MMHG | BODY MASS INDEX: 40.43 KG/M2 | HEART RATE: 111 BPM

## 2023-01-03 DIAGNOSIS — F41.9 ANXIETY: ICD-10-CM

## 2023-01-03 DIAGNOSIS — J45.40 MODERATE PERSISTENT ASTHMA WITHOUT COMPLICATION: ICD-10-CM

## 2023-01-03 DIAGNOSIS — R60.9 SWELLING: ICD-10-CM

## 2023-01-03 DIAGNOSIS — E66.01 CLASS 3 SEVERE OBESITY DUE TO EXCESS CALORIES WITHOUT SERIOUS COMORBIDITY WITH BODY MASS INDEX (BMI) OF 45.0 TO 49.9 IN ADULT (HCC): ICD-10-CM

## 2023-01-03 DIAGNOSIS — R05.1 ACUTE COUGH: ICD-10-CM

## 2023-01-03 DIAGNOSIS — I10 ESSENTIAL HYPERTENSION: ICD-10-CM

## 2023-01-03 DIAGNOSIS — E11.9 TYPE 2 DIABETES MELLITUS WITHOUT COMPLICATION, WITHOUT LONG-TERM CURRENT USE OF INSULIN (HCC): ICD-10-CM

## 2023-01-03 DIAGNOSIS — E78.5 HYPERLIPIDEMIA, UNSPECIFIED HYPERLIPIDEMIA TYPE: ICD-10-CM

## 2023-01-03 DIAGNOSIS — G47.33 OBSTRUCTIVE SLEEP APNEA: ICD-10-CM

## 2023-01-03 DIAGNOSIS — R06.02 SHORTNESS OF BREATH: Primary | ICD-10-CM

## 2023-01-03 DIAGNOSIS — E03.9 ACQUIRED HYPOTHYROIDISM: ICD-10-CM

## 2023-01-03 PROCEDURE — 3075F SYST BP GE 130 - 139MM HG: CPT | Performed by: FAMILY MEDICINE

## 2023-01-03 PROCEDURE — 3079F DIAST BP 80-89 MM HG: CPT | Performed by: FAMILY MEDICINE

## 2023-01-03 PROCEDURE — 99214 OFFICE O/P EST MOD 30 MIN: CPT | Performed by: FAMILY MEDICINE

## 2023-01-03 RX ORDER — BLOOD SUGAR DIAGNOSTIC
STRIP MISCELLANEOUS
Qty: 100 STRIP | Refills: 1 | Status: CANCELLED | OUTPATIENT
Start: 2023-01-03

## 2023-01-03 RX ORDER — BLOOD-GLUCOSE METER
1 KIT MISCELLANEOUS DAILY
Qty: 1 KIT | Refills: 0 | Status: CANCELLED | OUTPATIENT
Start: 2023-01-03

## 2023-01-03 RX ORDER — METFORMIN HYDROCHLORIDE 500 MG/1
500 TABLET, EXTENDED RELEASE ORAL 2 TIMES DAILY WITH MEALS
Qty: 60 TABLET | Refills: 3 | Status: SHIPPED | OUTPATIENT
Start: 2023-01-03

## 2023-01-03 RX ORDER — SERTRALINE HYDROCHLORIDE 100 MG/1
100 TABLET, FILM COATED ORAL DAILY
Qty: 30 TABLET | Refills: 3 | Status: CANCELLED | OUTPATIENT
Start: 2023-01-03

## 2023-01-03 RX ORDER — BENZONATATE 200 MG/1
200 CAPSULE ORAL 2 TIMES DAILY PRN
Qty: 30 CAPSULE | Refills: 0 | Status: SHIPPED | OUTPATIENT
Start: 2023-01-03 | End: 2023-01-18

## 2023-01-03 RX ORDER — BLOOD PRESSURE TEST KIT
KIT MISCELLANEOUS
Qty: 1 KIT | Refills: 0 | Status: CANCELLED | OUTPATIENT
Start: 2023-01-03

## 2023-01-03 RX ORDER — LISINOPRIL 40 MG/1
40 TABLET ORAL DAILY
Qty: 90 TABLET | Refills: 1 | Status: CANCELLED | OUTPATIENT
Start: 2023-01-03

## 2023-01-03 RX ORDER — ALBUTEROL SULFATE 90 UG/1
2 AEROSOL, METERED RESPIRATORY (INHALATION) EVERY 4 HOURS PRN
Qty: 2 EACH | Refills: 3 | Status: CANCELLED | OUTPATIENT
Start: 2023-01-03

## 2023-01-03 RX ORDER — FUROSEMIDE 40 MG/1
60 TABLET ORAL DAILY
Qty: 30 TABLET | Refills: 1 | Status: CANCELLED | OUTPATIENT
Start: 2023-01-03

## 2023-01-03 RX ORDER — LANCETS 30 GAUGE
1 EACH MISCELLANEOUS DAILY
Qty: 100 EACH | Refills: 1 | Status: CANCELLED | OUTPATIENT
Start: 2023-01-03

## 2023-01-03 ASSESSMENT — PATIENT HEALTH QUESTIONNAIRE - PHQ9
2. FEELING DOWN, DEPRESSED OR HOPELESS: 0
SUM OF ALL RESPONSES TO PHQ QUESTIONS 1-9: 0
SUM OF ALL RESPONSES TO PHQ9 QUESTIONS 1 & 2: 0
SUM OF ALL RESPONSES TO PHQ QUESTIONS 1-9: 0
1. LITTLE INTEREST OR PLEASURE IN DOING THINGS: 0

## 2023-01-03 ASSESSMENT — ENCOUNTER SYMPTOMS
ABDOMINAL PAIN: 0
SHORTNESS OF BREATH: 0
CHEST TIGHTNESS: 0
DIARRHEA: 0
COLOR CHANGE: 0
CONSTIPATION: 0
SORE THROAT: 0
VOMITING: 0

## 2023-01-03 NOTE — PATIENT INSTRUCTIONS
We are committed to providing you the best care possible. If you receive a survey after visiting one of our offices, please take time to share your experience concerning your physician office visit. These surveys are confidential and no health information about you is shared. We are eager to improve for you and we are counting on your feedback to help make that happen.       Zyrtec (cetirizine), allegra (fexofenadine)

## 2023-01-03 NOTE — PROGRESS NOTES
Subjective:      Chief Complaint   Patient presents with    2 Week Follow-Up       HPI:  Marlin Mancera is a 30 y.o. male who presents today for follow up of SOB/weight gain/swelling. Lasix dose was increased to 40mg and patient was referred to cardiology and pulmonology a few weeks ago. Saw cardiology last week, lasix dose was increased to 60mg daily. Was given fluid restriction and advised to make dietary changes. States he has lost about 40 lbs over the past few weeks. ECHO, stress test and lower extremity dopplers were ordered which are scheduled for next week. Was also advised to go to weight loss clinic, but is not sure whether referral was placed. Inhaler was switched to trelegy at last appointment. Patient states it has helped significantly. Is still using albuterol very frequently, but states 1 inhaler lasts him about 2 weeks instead of 1. Has first pulmonology appointment scheduled for next week. Does have history of CHRISTIAN but states he felt the CPAP made it worse, so has not used in years, no longer has a machine. Zoloft dose was also increased to 100mg daily. States he feels less \"whiny,\" but otherwise has not noticed significant changes. States he has been having some post nasal drip with a cough at night for the past few weeks. Denies any other URI symptoms. When he coughs something up, it is clear. Has tried OTC medications without much improvement.         Past Medical History:   Diagnosis Date    Anxiety     \"About Surgery\"    Asthma     \"As a child, no problems since then\"    Chronic back pain     Foot callus     \"Bilateral\"    Headache(784.0)     \"Use to have intense headaches\"    Meningitis spinal 1987 or 1988    Phobia     \"Phobia Of Needles\"    Shortness of breath on exertion         Past Surgical History:   Procedure Laterality Date    KNEE ARTHROSCOPY  [id number]    rt knee lateral menisectomy    OTHER SURGICAL HISTORY  1997    \"Removal Inhaled  Paper Clips From Tonsils\"       Social History     Tobacco Use    Smoking status: Former     Packs/day: 3.00     Years: 22.00     Pack years: 66.00     Types: Cigarettes     Start date:      Quit date: 2022     Years since quittin.8    Smokeless tobacco: Former     Quit date: 6/15/2002    Tobacco comments: \"Occ chewing tobacco when quit in \" chews nicotine gum   Substance Use Topics    Alcohol use: Yes     Comment: \"Occ\"        Review of Systems   Constitutional:  Negative for activity change, appetite change, chills, fever and unexpected weight change. HENT:  Negative for congestion and sore throat. Respiratory:  Negative for chest tightness and shortness of breath. Cardiovascular:  Negative for chest pain and palpitations. Gastrointestinal:  Negative for abdominal pain, constipation, diarrhea and vomiting. Genitourinary:  Negative for dysuria. Skin:  Negative for color change. Neurological:  Negative for dizziness and light-headedness. Psychiatric/Behavioral:  Negative for dysphoric mood. The patient is not nervous/anxious. Prior to Visit Medications    Medication Sig Taking? Authorizing Provider   furosemide (LASIX) 40 MG tablet Take 1.5 tablets by mouth daily Yes Shanna Burns MD   fluticasone-umeclidin-vilant (TRELEGY ELLIPTA) 100-62.5-25 MCG/ACT AEPB inhaler Inhale 1 puff into the lungs daily Yes Ulisses Kothari MD   albuterol sulfate HFA (VENTOLIN HFA) 108 (90 Base) MCG/ACT inhaler Inhale 2 puffs into the lungs every 4 hours as needed for Wheezing or Shortness of Breath Yes Ulisses Kothari MD   sertraline (ZOLOFT) 100 MG tablet Take 1 tablet by mouth daily Yes Ulisses Kothari MD   blood glucose test strips (PRODIGY NO CODING BLOOD GLUC) strip TEST BLOOD GLUCOSE ONCE A DAY & AS NEEDED FOR SYMPTOMS OF IRREGULAR BLOOD GLUCOSE.  Yes Ulisses Kothari MD   Blood Pressure KIT Check blood pressure daily Yes LIZ Retana - CNP   metFORMIN (Bal Gee) 500 MG extended release tablet Take 1 tablet by mouth 2 times daily (with meals) Yes Pippa Jovel MD   lisinopril (PRINIVIL;ZESTRIL) 40 MG tablet Take 1 tablet by mouth daily Yes Pippa Jovel MD   glucose monitoring (FREESTYLE FREEDOM) kit 1 kit by Does not apply route daily Yes Pippa Jovel MD   Lancets MISC 1 each by Does not apply route daily Yes Pippa Jovel MD   hydroCHLOROthiazide (MICROZIDE) 12.5 MG capsule Take 1 capsule by mouth every morning  Patient not taking: No sig reported  Pippa Jovel MD          Objective:      /80 (Site: Left Upper Arm, Position: Sitting, Cuff Size: Medium Adult)   Pulse (!) 111   Ht 6' 2\" (1.88 m)   Wt (!) 346 lb (156.9 kg)   SpO2 94%   BMI 44.42 kg/m²      Physical Exam  Vitals and nursing note reviewed. Constitutional:       General: He is not in acute distress. Appearance: Normal appearance. He is obese. He is not ill-appearing or toxic-appearing. HENT:      Head: Normocephalic and atraumatic. Right Ear: External ear normal.      Left Ear: External ear normal.      Nose: Nose normal.      Mouth/Throat:      Pharynx: Oropharynx is clear. Eyes:      General: No scleral icterus. Right eye: No discharge. Left eye: No discharge. Extraocular Movements: Extraocular movements intact. Conjunctiva/sclera: Conjunctivae normal.   Cardiovascular:      Rate and Rhythm: Regular rhythm. Tachycardia present. Heart sounds: Normal heart sounds. Pulmonary:      Effort: Pulmonary effort is normal.      Breath sounds: Normal breath sounds. No wheezing or rales. Musculoskeletal:         General: No deformity. Cervical back: Normal range of motion and neck supple. No rigidity. Skin:     General: Skin is warm and dry. Findings: No rash. Neurological:      General: No focal deficit present. Mental Status: He is alert. Mental status is at baseline. Motor: No weakness. Psychiatric:         Mood and Affect: Mood normal.         Behavior: Behavior normal.          Assessment / Plan:      1. Shortness of breath  Likely mostly 2/2 CHF (as well as COPD/asthma). Now significantly improved with increased diuresis and escalation to triple inhaler therapy. Has ECHO, stress test and lower extremity dopplers scheduled for next week, as well as pulmonology evaluation. Continue lasix 60mg until cardiology follow up. 2. Type 2 diabetes mellitus without complication, without long-term current use of insulin (HCC)  Well controlled with current regimen. Will continue. - metFORMIN (GLUCOPHAGE-XR) 500 MG extended release tablet; Take 1 tablet by mouth 2 times daily (with meals)  Dispense: 60 tablet; Refill: 3  - Hemoglobin A1C; Future    3. Swelling  2/2 CHF, improved with increased lasix dose. Continue following with cardiology. 4. Moderate persistent asthma without complication  Some improvement with switch to trelegy but still poorly controlled. Has pulmonology evaluation next week. 5. Essential hypertension  Stable, well controlled. Continue current medications. - CBC with Auto Differential; Future  - Comprehensive Metabolic Panel; Future    6. Anxiety  Has noticed some mild improvement with increased dose of zoloft- will continue. 7. Class 3 severe obesity due to excess calories without serious comorbidity with body mass index (BMI) of 45.0 to 49.9 in adult [facility])  Will refer to weight loss clinic.   - Bartolo Gutierres NP, [address] [place] [address], [facility]    8. Acute cough  Likely 2/2 post nasal drip. Advised starting zyrtec or allegra, will also provide cough suppressant. Discussed that cough can also be 2/2 CHF, asthma and/or COPD (all of which are poorly controlled) and to address with specialist if symptoms not improving with treatment. - benzonatate (TESSALON) 200 MG capsule;  Take 1 capsule by mouth 2 times daily as needed for Cough  Dispense: 30 capsule; Refill: 0    9. Hyperlipidemia, unspecified hyperlipidemia type  Will monitor.   - Lipid Panel; Future    10. Acquired hypothyroidism  Last TSH wnl. Will monitor labs. - TSH; Future    11. Obstructive sleep apnea  Discussed importance of CPAP compliance. Patient no longer has machine, but will be seeing pulmonology next week. I have spent 45 minutes on this patient encounter. Patient voiced understanding and agreement with plan. All questions/concerns were addressed, risks/side effects of medications were reviewed. Return precautions and after visit summary were provided. Return in about 3 months (around 4/3/2023). or earlier as needed.       Rober Donovan MD

## 2023-01-05 DIAGNOSIS — E11.9 TYPE 2 DIABETES MELLITUS WITHOUT COMPLICATION, WITHOUT LONG-TERM CURRENT USE OF INSULIN (HCC): ICD-10-CM

## 2023-01-05 RX ORDER — BLOOD PRESSURE TEST KIT
KIT MISCELLANEOUS
Qty: 100 EACH | Refills: 0 | Status: SHIPPED | OUTPATIENT
Start: 2023-01-05

## 2023-01-05 RX ORDER — LANCETS 28 GAUGE
EACH MISCELLANEOUS
Qty: 100 EACH | Refills: 1 | Status: SHIPPED | OUTPATIENT
Start: 2023-01-05

## 2023-01-09 ENCOUNTER — PROCEDURE VISIT (OUTPATIENT)
Dept: CARDIOLOGY CLINIC | Age: 38
End: 2023-01-09
Payer: COMMERCIAL

## 2023-01-09 ENCOUNTER — PROCEDURE VISIT (OUTPATIENT)
Dept: CARDIOLOGY CLINIC | Age: 38
End: 2023-01-09

## 2023-01-09 ENCOUNTER — INITIAL CONSULT (OUTPATIENT)
Dept: PULMONOLOGY | Age: 38
End: 2023-01-09
Payer: COMMERCIAL

## 2023-01-09 VITALS — WEIGHT: 315 LBS | HEART RATE: 109 BPM | OXYGEN SATURATION: 90 % | HEIGHT: 74 IN | BODY MASS INDEX: 40.43 KG/M2

## 2023-01-09 DIAGNOSIS — G47.33 OBSTRUCTIVE SLEEP APNEA: Primary | ICD-10-CM

## 2023-01-09 DIAGNOSIS — R60.0 EDEMA OF LOWER EXTREMITY: ICD-10-CM

## 2023-01-09 DIAGNOSIS — J45.909 ASTHMA, UNSPECIFIED ASTHMA SEVERITY, UNSPECIFIED WHETHER COMPLICATED, UNSPECIFIED WHETHER PERSISTENT: ICD-10-CM

## 2023-01-09 DIAGNOSIS — R60.9 EDEMA, UNSPECIFIED TYPE: ICD-10-CM

## 2023-01-09 DIAGNOSIS — R06.02 SHORTNESS OF BREATH: ICD-10-CM

## 2023-01-09 DIAGNOSIS — F17.200 TOBACCO DEPENDENCE: ICD-10-CM

## 2023-01-09 DIAGNOSIS — G47.33 OBSTRUCTIVE SLEEP APNEA: ICD-10-CM

## 2023-01-09 LAB
EXPIRATORY TIME-POST: NORMAL
EXPIRATORY TIME: NORMAL
FEF 25-75% %CHNG: NORMAL
FEF 25-75% %PRED-POST: NORMAL
FEF 25-75% %PRED-PRE: NORMAL
FEF 25-75% PRED: NORMAL
FEF 25-75%-POST: NORMAL
FEF 25-75%-PRE: NORMAL
FEV1 %PRED-POST: 19.7 %
FEV1 %PRED-PRE: 23.9 %
FEV1 PRED: 4.87 L
FEV1-POST: 0.96 L
FEV1-PRE: 1.16 L
FEV1/FVC %PRED-POST: 44.8 %
FEV1/FVC %PRED-PRE: 56.7 %
FEV1/FVC PRED: 80.8 %
FEV1/FVC-POST: 36.2 %
FEV1/FVC-PRE: 45.8 %
FVC %PRED-POST: 43.5 L
FVC %PRED-PRE: 41.8 %
FVC PRED: 6.09 L
FVC-POST: 2.64 L
FVC-PRE: 2.54 L
LV EF: 32 %
LV EF: 33 %
LVEF MODALITY: NORMAL
LVEF MODALITY: NORMAL
PEF %PRED-POST: NORMAL
PEF %PRED-PRE: NORMAL
PEF PRED: NORMAL
PEF%CHNG: NORMAL
PEF-POST: NORMAL
PEF-PRE: NORMAL

## 2023-01-09 PROCEDURE — 93306 TTE W/DOPPLER COMPLETE: CPT | Performed by: INTERNAL MEDICINE

## 2023-01-09 PROCEDURE — 99204 OFFICE O/P NEW MOD 45 MIN: CPT | Performed by: NURSE PRACTITIONER

## 2023-01-09 ASSESSMENT — SLEEP AND FATIGUE QUESTIONNAIRES
NECK CIRCUMFERENCE (INCHES): 18
HOW LIKELY ARE YOU TO NOD OFF OR FALL ASLEEP WHILE LYING DOWN TO REST IN THE AFTERNOON WHEN CIRCUMSTANCES PERMIT: 3
HOW LIKELY ARE YOU TO NOD OFF OR FALL ASLEEP WHILE SITTING INACTIVE IN A PUBLIC PLACE: 1
HOW LIKELY ARE YOU TO NOD OFF OR FALL ASLEEP WHILE SITTING AND TALKING TO SOMEONE: 1
ESS TOTAL SCORE: 10
HOW LIKELY ARE YOU TO NOD OFF OR FALL ASLEEP WHILE SITTING AND READING: 2
HOW LIKELY ARE YOU TO NOD OFF OR FALL ASLEEP WHEN YOU ARE A PASSENGER IN A CAR FOR AN HOUR WITHOUT A BREAK: 0
HOW LIKELY ARE YOU TO NOD OFF OR FALL ASLEEP WHILE SITTING QUIETLY AFTER LUNCH WITHOUT ALCOHOL: 1
HOW LIKELY ARE YOU TO NOD OFF OR FALL ASLEEP IN A CAR, WHILE STOPPED FOR A FEW MINUTES IN TRAFFIC: 0
HOW LIKELY ARE YOU TO NOD OFF OR FALL ASLEEP WHILE WATCHING TV: 2

## 2023-01-09 ASSESSMENT — PULMONARY FUNCTION TESTS
FEV1_POST: 0.96
FEV1/FVC_PERCENT_PREDICTED_POST: 44.8
FVC_PREDICTED: 6.09
FEV1/FVC_POST: 36.2
FVC_PERCENT_PREDICTED_POST: 43.5
FEV1_PERCENT_PREDICTED_POST: 19.7
FVC_POST: 2.64
FEV1_PREDICTED: 4.87
FVC_PERCENT_PREDICTED_PRE: 41.8
FEV1/FVC_PRE: 45.8
FVC_PRE: 2.54
FEV1_PRE: 1.16
FEV1/FVC_PREDICTED: 80.8
FEV1_PERCENT_PREDICTED_PRE: 23.9
FEV1/FVC_PERCENT_PREDICTED_PRE: 56.7

## 2023-01-09 ASSESSMENT — ENCOUNTER SYMPTOMS
EYES NEGATIVE: 1
GASTROINTESTINAL NEGATIVE: 1
SHORTNESS OF BREATH: 1
COUGH: 1

## 2023-01-09 NOTE — PROGRESS NOTES
Ana Ellis (:  1985) is a 30 y.o. male,New patient, here for evaluation of the following chief complaint(s):  Consultation (SOB)        Subjective   SUBJECTIVE/OBJECTIVE:  Kanika Person 31 yo male was referred to pulmonary clinic for shortness of breath. He is here with a support  who shares with providing some of his health history. Fanta Pollack reports quitting smoking as of 2022. Before that he was smoking since . He reports being diagnosed with sleep apnea two years ago, but wasn't able to wear the CPAP head gear because it was causing him to have more shortness of breath and anxiety. He used a full face mask at that time. I spoke with Fanta Pollack on trying  nasal pillows instead even through he is a mouth breather. This will help him adjust to being able to take in the pressures easier. He states that he is willing to try it. He reports that he works at [employer] and has found himself having  anxiety attacks just before reporting to the floor. 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 He is asking for a department change where he can be the store  which will reduce stress on breathing. Fanta Pollack did contract Covid-19 about a year ago with very mild symptoms and was not hospitalized for it. He has not had any flu or pneumonia. He does not feel comfortable with taking vaccines according to his support . But he does comply with Covid-19 recommendations with social distancing, wearing a face mask as needed, washing hands and wiping down surfaces. Upon entering the exam room, Jonah's oxygenation saturation was found to be 90% on room air. I reevaluated him after he had returned to the room and the measurement had risen to 94% with walking on room air. His breathing is labored on exertion, walking.  He reports using his Albuterol inhaler often before he ws started on Trelegy but has cut down to a couple times a day with the medication lasting about 2 weeks. We discussed the side effects has on heart and lungs causing tremors, nervousness, affecting sleep quality and tremors. He reports weaning himself down and waiting a bit and trying to control his SOB before using the medication and feels that he is doing much better. He addressed his weight loss stating that he had lost 100 pounds and is still working on the weight. He is scheduled to to the dietician in about a week and also following up with cardiology. Chest XR on 12/21/2022  FINDINGS:  The cardiomediastinal silhouette is normal in size. The lungs are clear. No pleural effusion or pneumothorax is present. No acute cardiopulmonary processes. CT wo contrast ordered to further evaluate for SOB    Review of Systems   Constitutional:  Positive for fatigue. HENT: Negative. Eyes: Negative. Respiratory:  Positive for cough and shortness of breath. Cardiovascular:  Positive for leg swelling. Gastrointestinal: Negative. Endocrine: Negative. Genitourinary: Negative. Musculoskeletal: Negative. Skin: Negative. Allergic/Immunologic: Positive for environmental allergies. Animal dander, dust and chemicals, fumes    Neurological:  Positive for tremors, weakness and headaches. Psychiatric/Behavioral:  Positive for agitation and sleep disturbance. The patient is nervous/anxious. Objective   Physical Exam  Vitals and nursing note reviewed. Exam conducted with a chaperone present. Constitutional:       General: He is awake. Appearance: Normal appearance. He is well-developed and well-groomed. He is obese. HENT:      Head: Normocephalic. Nose: Nose normal.      Mouth/Throat:      Mouth: Mucous membranes are moist.      Pharynx: Oropharynx is clear. Eyes:      Extraocular Movements: Extraocular movements intact. Pupils: Pupils are equal, round, and reactive to light.    Cardiovascular:      Rate and Rhythm: Tachycardia present. Pulses: Normal pulses. Heart sounds: S1 normal and S2 normal.   Pulmonary:      Breath sounds: Decreased air movement present. Examination of the right-upper field reveals decreased breath sounds. Examination of the left-upper field reveals decreased breath sounds. Examination of the right-middle field reveals decreased breath sounds. Examination of the right-lower field reveals decreased breath sounds. Examination of the left-lower field reveals decreased breath sounds. Decreased breath sounds present. Comments: Labored breathing while ambulating. Musculoskeletal:         General: Normal range of motion. Cervical back: Normal range of motion and neck supple. Right lower le+ Edema present. Left lower le+ Edema present. Skin:     General: Skin is warm and dry. Neurological:      General: No focal deficit present. Mental Status: He is alert and oriented to person, place, and time. Psychiatric:         Mood and Affect: Mood normal.         Behavior: Behavior normal. Behavior is cooperative. Thought Content: Thought content normal.         Judgment: Judgment normal.         ASSESSMENT/PLAN:  1. Obstructive sleep apnea  -     CT CHEST WO CONTRAST; Future  -     Home Sleep Study; Future  -     Spirometry With Bronchodilator; Future  2. Tobacco dependence  3. Shortness of breath  -     CT CHEST WO CONTRAST; Future  4. Asthma, unspecified asthma severity, unspecified whether complicated, unspecified whether persistent    Rose Pearl was prescribed Trelegy 100 mcg by Dr. Ange Roman and is using the Albuterol inhaler less now. PFT : in office spirometry demonstrated very severe lung obstruction. There was no significant change per/post bronchodilator. I recommend doing a full PFT with flow volume loop and DLCO measurement in the near future. First things first with reestablishing the use of CPAP and helping Rose Pearl get quality sleep. Vital signs   Pulse (!) 109   Ht 6' 2\" (1.88 m)   Wt (!) 337 lb (152.9 kg)   SpO2 90%   BMI 43.27 kg/m²      No follow-ups on file. Sleep Medicine 2021   Sitting and reading 0 -   Watching TV 0 -   Sitting, inactive in a public place (e.g. a theatre or a meeting) 0 -   As a passenger in a car for an hour without a break 0 -   Lying down to rest in the afternoon when circumstances permit 3 -   Sitting and talking to someone 0 -   Sitting quietly after a lunch without alcohol 1 -   In a car, while stopped for a few minutes in traffic 0 -   [employer] Sleepiness Score 4 -   Neck circumference (Inches) 20.5 20.5     ESS   Sleep Medicine 2023   Sitting and reading 2   Watching TV 2   Sitting, inactive in a public place (e.g. a theatre or a meeting) 1   As a passenger in a car for an hour without a break 0   Lying down to rest in the afternoon when circumstances permit 3   Sitting and talking to someone 1   Sitting quietly after a lunch without alcohol 1   In a car, while stopped for a few minutes in traffic 0   [employer] Sleepiness Score 10   Neck circumference (Inches) 18     Mallampati score 2     Sleeps on contour pillow with one additional pillow. Tobacco Use      Smoking status: Former        Packs/day: 3.00        Years: 22.00        Pack years: 77        Types: Cigarettes        Start date:         Quit date: 2022        Years since quittin.8      Smokeless tobacco: Former      Tobacco comments: \"Occ chewing tobacco when quit in \" chews nicotine gum     No medication comments found. Dario Chamorro verbally expressed understanding of the information we discussed. All of his concerns were addressed to his satisfaction. He is in agreement with the treatment plan. An electronic signature was used to authenticate this note.     --LIZ Irwin - CNP

## 2023-01-10 ENCOUNTER — OFFICE VISIT (OUTPATIENT)
Dept: CARDIOLOGY CLINIC | Age: 38
End: 2023-01-10
Payer: COMMERCIAL

## 2023-01-10 ENCOUNTER — TELEPHONE (OUTPATIENT)
Dept: CARDIOLOGY CLINIC | Age: 38
End: 2023-01-10

## 2023-01-10 VITALS
SYSTOLIC BLOOD PRESSURE: 134 MMHG | WEIGHT: 315 LBS | BODY MASS INDEX: 40.43 KG/M2 | HEART RATE: 92 BPM | DIASTOLIC BLOOD PRESSURE: 88 MMHG | HEIGHT: 74 IN

## 2023-01-10 DIAGNOSIS — E66.01 CLASS 3 SEVERE OBESITY DUE TO EXCESS CALORIES WITHOUT SERIOUS COMORBIDITY WITH BODY MASS INDEX (BMI) OF 40.0 TO 44.9 IN ADULT (HCC): ICD-10-CM

## 2023-01-10 DIAGNOSIS — E78.5 DYSLIPIDEMIA: ICD-10-CM

## 2023-01-10 DIAGNOSIS — R94.39 ABNORMAL STRESS TEST: ICD-10-CM

## 2023-01-10 DIAGNOSIS — R60.9 SWELLING: ICD-10-CM

## 2023-01-10 DIAGNOSIS — I10 ESSENTIAL HYPERTENSION: ICD-10-CM

## 2023-01-10 DIAGNOSIS — I42.9 CARDIOMYOPATHY, UNSPECIFIED TYPE (HCC): Primary | ICD-10-CM

## 2023-01-10 DIAGNOSIS — R06.02 SHORTNESS OF BREATH: ICD-10-CM

## 2023-01-10 PROCEDURE — 3079F DIAST BP 80-89 MM HG: CPT | Performed by: INTERNAL MEDICINE

## 2023-01-10 PROCEDURE — 3075F SYST BP GE 130 - 139MM HG: CPT | Performed by: INTERNAL MEDICINE

## 2023-01-10 PROCEDURE — 99214 OFFICE O/P EST MOD 30 MIN: CPT | Performed by: INTERNAL MEDICINE

## 2023-01-10 RX ORDER — METOPROLOL SUCCINATE 25 MG/1
25 TABLET, EXTENDED RELEASE ORAL DAILY
Qty: 30 TABLET | Refills: 3 | Status: SHIPPED | OUTPATIENT
Start: 2023-01-10

## 2023-01-10 RX ORDER — MULTIVIT WITH MINERALS/LUTEIN
250 TABLET ORAL DAILY
COMMUNITY

## 2023-01-10 RX ORDER — SPIRONOLACTONE 25 MG/1
25 TABLET ORAL DAILY
Qty: 90 TABLET | Refills: 1 | Status: SHIPPED | OUTPATIENT
Start: 2023-01-10

## 2023-01-10 RX ORDER — FUROSEMIDE 40 MG/1
60 TABLET ORAL DAILY
Qty: 30 TABLET | Refills: 1 | Status: SHIPPED | OUTPATIENT
Start: 2023-01-10

## 2023-01-10 NOTE — PROGRESS NOTES
Shaheen Dickson MD                                  CARDIOLOGY  NOTE           Chief Complaint:    Chief Complaint   Patient presents with    Results     Heart is racing when moving around, feet are swollen, shortness of breathe         HPI:     Yariel Figueroa is a 30y.o. year old male who presents to the clinic with chief complaint of shortness of breath lower extremity edema    Patient is 6 feet 2 inches and 386 pounds. Mentions that over the past several months he has been diagnosed with diabetes mellitus, has been diagnosed with lower extremity edema and is on Lasix. Patient mentions that he has been experiencing shortness of breath with little exertion as well as lower extremity edema. Denies any PND orthopnea. Patient has been previously diagnosed with sleep apnea however is noncompliant with sleep apnea  Patient is a former smoker who quit 9 months ago. Smokes marijuana 2-3 times per week. Denies any alcohol abuse. Family history is positive for premature CAD in his father who  at the age of 39 from heart attack, also was a drug abuser. Patient first cousin had congenital heart disease which was operated upon.     EKG in the office today shows sinus rhythm, sinus tachycardia    Current Outpatient Medications   Medication Sig Dispense Refill    Ascorbic Acid (VITAMIN C) 250 MG tablet Take 250 mg by mouth daily      Cetirizine HCl (ZYRTEC PO) Take by mouth      metoprolol succinate (TOPROL XL) 25 MG extended release tablet Take 1 tablet by mouth daily 30 tablet 3    spironolactone (ALDACTONE) 25 MG tablet Take 1 tablet by mouth daily 90 tablet 1    empagliflozin (JARDIANCE) 10 MG tablet Take 1 tablet by mouth daily 90 tablet 1    sacubitril-valsartan (ENTRESTO) 24-26 MG per tablet Take 1 tablet by mouth 2 times daily Start Friday after stopping lisinopril today 60 tablet 3    Alcohol Swabs PADS USE AS DIRECTED TO CLEAN SKIN PRIOR TO CHECKING BLOOD GLUCOSE 100 each 0    Prodigy Twist Top Lancets 28G MISC TEST BLOOD GLUCOSE ONCE A  each 1    metFORMIN (GLUCOPHAGE-XR) 500 MG extended release tablet Take 1 tablet by mouth 2 times daily (with meals) 60 tablet 3    benzonatate (TESSALON) 200 MG capsule Take 1 capsule by mouth 2 times daily as needed for Cough 30 capsule 0    furosemide (LASIX) 40 MG tablet Take 1.5 tablets by mouth daily 30 tablet 1    fluticasone-umeclidin-vilant (TRELEGY ELLIPTA) 100-62.5-25 MCG/ACT AEPB inhaler Inhale 1 puff into the lungs daily 1 each 1    albuterol sulfate HFA (VENTOLIN HFA) 108 (90 Base) MCG/ACT inhaler Inhale 2 puffs into the lungs every 4 hours as needed for Wheezing or Shortness of Breath 2 each 3    sertraline (ZOLOFT) 100 MG tablet Take 1 tablet by mouth daily 30 tablet 3    blood glucose test strips (PRODIGY NO CODING BLOOD GLUC) strip TEST BLOOD GLUCOSE ONCE A DAY & AS NEEDED FOR SYMPTOMS OF IRREGULAR BLOOD GLUCOSE. 100 strip 1    Blood Pressure KIT Check blood pressure daily 1 kit 0    glucose monitoring (FREESTYLE FREEDOM) kit 1 kit by Does not apply route daily 1 kit 0     No current facility-administered medications for this visit. Allergies:     Patient has no known allergies. Patient History:    Past Medical History:   Diagnosis Date    Anxiety     \"About Surgery\"    Asthma     \"As a child, no problems since then\"    Chronic back pain     Foot callus     \"Bilateral\"    Headache(784.0)     \"Use to have intense headaches\"    History of stress test 01/09/2023    Medium sized defect of moderate severity which is reversible involving anterolateral wall of myocardium. EF 32 %    Hx of echocardiogram 01/09/2023    EF is estimated at 30-35%. Severly enlarged right side of the heart.     Meningitis spinal 1987 or 1988    Phobia     \"Phobia Of Needles\"    Shortness of breath on exertion      Past Surgical History:   Procedure Laterality Date    KNEE ARTHROSCOPY  [id number]    rt knee lateral menisectomy    OTHER SURGICAL HISTORY  1997    \"Removal Inhaled  Paper Clips From Tonsils\"     Family History   Problem Relation Age of Onset    Heart Disease Father         \"Heart Attack\"    Early Death Father 39    Substance Abuse Father         \"Drug And Alcohol Abuse\"    Other Father         \"Emphysema\"    Early Death Mother 39    Other Mother         \"Web toes on one foot\"    Substance Abuse Mother         \"Drugs And Alcohol\"    Substance Abuse Sister         \"Drugs And Alcohol\"    Cancer Sister         \"Ovarian Cancer\"    Miscarriages / [place] Sister      Social History     Tobacco Use    Smoking status: Former     Packs/day: 3.00     Years: 22.00     Pack years: 66.00     Types: Cigarettes     Start date:      Quit date: 2022     Years since quittin.8    Smokeless tobacco: Former     Quit date: 6/15/2002    Tobacco comments: \"Occ chewing tobacco when quit in \" chews nicotine gum   Substance Use Topics    Alcohol use: Yes     Comment: \"Occ\"        Review of Systems:     Constitutional:  No Fever or Weight Loss   Eyes: No Decreased Vision  ENT: No Headaches, Hearing Loss or Vertigo  Cardiovascular: No Chest Pain,  +++  Shortness of breath, No Palpitations. No Edema   Respiratory: No cough or wheezing .  No Respiratory distress   Gastrointestinal: No abdominal pain, appetite loss, blood in stools, constipation, diarrhea or heartburn  Genitourinary: No dysuria, trouble voiding, or hematuria  Musculoskeletal:  denies any new  joint aches , or pain   Integumentary: No rash or pruritis  Neurological: No TIA or stroke symptoms  Psychiatric: No anxiety or depression  Endocrine: No malaise, fatigue or temperature intolerance  Hematologic/Lymphatic: No bleeding problems, blood clots or swollen lymph nodes  Allergic/Immunologic: No nasal congestion or hives        Objective:      Physical Exam:    /88 (Site: Left Upper Arm, Position: Sitting, Cuff Size: Medium Adult)   Pulse 92   Ht 6' 2\" (1.88 m)   Wt (!) 338 lb 9.6 oz (153.6 kg)   BMI 43.47 kg/m²   Wt Readings from Last 3 Encounters:   01/10/23 (!) 338 lb 9.6 oz (153.6 kg)   01/09/23 (!) 337 lb (152.9 kg)   01/03/23 (!) 346 lb (156.9 kg)     Body mass index is 43.47 kg/m². Vitals:    01/10/23 1529   BP: 134/88   Pulse: 92        General Appearance and Constitutional: Conversant, Well developed, Well nourished, No acute distress, Non-toxic appearance. HEENT:  Normocephalic, Atraumatic, Bilateral external ears normal, Oropharynx moist, No oral exudates,   Nose normal.   Neck- Normal range of motion, No tenderness, Supple  Eyes:   Conjunctiva normal, No discharge. Respiratory:  Normal breath sounds, No respiratory distress, No wheezing, No Rales, No Ronchi. No chest tenderness. Cardiovascular: S1-S2, no added heart sounds, No Mumurs appreciated. No gallops, rubs. ++  Pedal Edema   GI:  Bowel sounds normal, Soft, No tenderness,  :  No costovertebral angle tenderness   Musculoskeletal:  No gross deformities.  Back- No tenderness  Integument:  Well hydrated, no rash   Lymphatic:  No lymphadenopathy noted   Neurologic:  Alert & oriented x 3, Normal motor function, normal sensory function, no focal deficits noted   Psychiatric:  Speech and behavior appropriate       Medical decision making and Data review:    DATA:    No results found for: TROPONINT  BNP:    Lab Results   Component Value Date    PROBNP 4,725 (H) 12/15/2022     PT/INR:  No results found for: PTINR  Lab Results   Component Value Date    LABA1C 6.6 12/15/2022    LABA1C 6.6 08/26/2022     Lab Results   Component Value Date    CHOL 160 07/26/2017    TRIG 369 (H) 07/26/2017    HDL 26 (L) 07/26/2017    LDLDIRECT 100 (H) 05/19/2014     Lab Results   Component Value Date    WBC 6.0 12/15/2022    HGB 11.7 (L) 12/15/2022    HCT 37.4 (L) 12/15/2022    MCV 90.5 12/15/2022     12/15/2022     TSH:   Lab Results   Component Value Date    TSH 3.37 12/15/2022     Lab Results   Component Value Date    AST 27 12/15/2022    ALT 15 12/15/2022    BILITOT 1.9 (H) 12/15/2022    ALKPHOS 124 12/15/2022         All labs, medications and tests reviewed by myself including data and history from outside source , patient and available family . 1. Cardiomyopathy, unspecified type ([address]    2. Abnormal stress test    3. Essential hypertension    4. Dyslipidemia    5. Class 3 severe obesity due to excess calories without serious comorbidity with body mass index (BMI) of 40.0 to 44.9 in adult [facility])           Impression and Plan:      Shortness of breath with exertion  Congestive heart failure, Acute on Ch. Systolic   Severe cardiomyopathy with LV ejection fraction of 30%. Bilateral lower extremity edema +2      Unclear etiology of cardiomyopathy  Schedule patient for left and right heart cardiac catheterization  Stress test indicates anterior lateral ischemia, dilated cardiomyopathy, echocardiogram also shows severe cardiomyopathy. Start patient on guideline directed medical therapy    Start with Toprol-XL 25 mg daily  Start low-dose Aldactone 25 daily  Of lisinopril. After washout. Start patient on Entresto  Continue with diuretics Lasix 60 mg daily  Start patient on Jardiance 10 mg daily  Check lipid panel: Start patient on Lipitor 40 daily     Restrict fluid to less than 1500 cc/day  Patient was counseled extensively to give up salt food, prefer home based food. Essential hypertension: Blood pressure meds adjustment as above    Diabetes mellitus: Patient is on antidiabetic medications. Management primary team    Obstructive sleep apnea: Patient has been diagnosed with sleep apnea in the past however noncompliant with CPAP. He was urged to restart his CPAP. He has an appointment to see pulmonologist.    Morbid obesity with BMI of 49.56. Low-salt low-fat diet and exercise as tolerated patient is following up with weight loss clinic already. Diabetes mellitus: On metformin.   Start Otto Geiger    Former smoker      Return in about 1 month (around 2/10/2023). Counseled extensively and medication compliance urged. We discussed that for the  prevention of ASCVD our  goal is aggressive risk modification. Patient is encouraged to exercise even a brisk walk for 30 minutes  at least 3 to 4 times a week   Various goals were discussed and questions answered. Continue current medications. Appropriate prescriptions are addressed and refills ordered. Questions answered and patient verbalizes understanding. Call for any problems, questions, or concerns.

## 2023-01-10 NOTE — PROGRESS NOTES
Patient was here in office & educated on r& lhc for Dx: CMP/ABN NM. Procedure is scheduled for 1/13/23 @ NOON, w/arrival @ 10 AM, @ [facility]. Pre-admission orders were given to patient for labs which are due 1/12/23 @ [address] [facility]. Procedure and risks were explained to patient. Consent forms were signed. Instructions were given to patient to remain NPO after midnight the night before procedure. Patient may take morning meds the morning of procedure with small amount of water. Patient was notified that procedure could be delayed due to an emergency. Patient voiced understanding.  Copies of consent, pre-testing orders, & instructions scanned into media

## 2023-01-10 NOTE — TELEPHONE ENCOUNTER
1/9/23  ECHO        Summary   Left ventricular function is severely abnormal , EF is estimated at 30-35%. Left ventricle size is normal.   Global hypokinesis noted. Moderate left ventricular hypertrophy. Grade II diastolic dysfunction. Moderately dilated left atrium. Severly enlarged right side of the heart. Mild mitral and moderate tricuspid regurgitation is present. Moderate Pulmonary hypertension with RVSP of 50mmHg. No evidence of pericardial effusion. Large Left pleural effusion      1/9/2023  NM      Summary    Medium sized defect of moderate severity which is reversible involving    anterolateral wall of myocardium.     Low EF 32 %        Recommendation    abnormal stress test with anterolateral wall ischemia and pathcy    distrubition of radiotracer        Recommend LHC/RHC - Please follow up as outpt to discuss results

## 2023-01-10 NOTE — LETTER
[address]  [address] [address]  Phone: [phone]  Fax: [phone]    Salian Barreto MD        January 10, 2023     Patient: Arjun Guardian   YOB: 1985   Date of Visit: 1/10/2023       To Whom it May Concern:    Kerrie Mckeon was seen in my clinic on 1/10/2023. He may return to work on 01/18/2023. If you have any questions or concerns, please don't hesitate to call.     Sincerely,         Salina Barreto MD

## 2023-01-11 ENCOUNTER — TELEPHONE (OUTPATIENT)
Dept: CARDIOLOGY CLINIC | Age: 38
End: 2023-01-11

## 2023-01-11 ENCOUNTER — OFFICE VISIT (OUTPATIENT)
Dept: BARIATRICS/WEIGHT MGMT | Age: 38
End: 2023-01-11

## 2023-01-11 VITALS — HEIGHT: 74 IN | BODY MASS INDEX: 40.43 KG/M2 | WEIGHT: 315 LBS

## 2023-01-11 DIAGNOSIS — E66.01 MORBID OBESITY WITH BMI OF 40.0-44.9, ADULT (HCC): Primary | ICD-10-CM

## 2023-01-11 PROCEDURE — 99999 PR OFFICE/OUTPT VISIT,PROCEDURE ONLY: CPT | Performed by: SURGERY

## 2023-01-11 NOTE — TELEPHONE ENCOUNTER
Curbrittany Hollingsworth at [employer] has a question about the Qty and dose of furosomide 40 mg.   Please call [phone]

## 2023-01-11 NOTE — PROGRESS NOTES
[facility] - Non-Surgical Weight Loss Program    REASON FOR VISIT: Initial Non-Surgical Program    Chief Complaint:    Chief Complaint   Patient presents with    Weight Management       SUBJECTIVE:  Pt here for initial nutrition consult in non-surgical weight loss program. Has been experiencing wt/fluid gain on lasix, DM, plans for LHC soon. The patient is a 30 y.o. male being seen for obesity, enrolled in Non-Surgical Weight Loss Program; Jonah's, Height: 6' 2\" (188 cm), Weight: (!) 335 lb (152 kg), Current Body mass index is 43.01 kg/m². patient's PCP is Aury Roblero MD    Comorbid Conditions:  Significant diseases affecting this patient are   Past Medical History:   Diagnosis Date    Anxiety     \"About Surgery\"    Asthma     \"As a child, no problems since then\"    Chronic back pain     Foot callus     \"Bilateral\"    Headache(784.0)     \"Use to have intense headaches\"    History of stress test 01/09/2023    Medium sized defect of moderate severity which is reversible involving anterolateral wall of myocardium. EF 32 %    Hx of echocardiogram 01/09/2023    EF is estimated at 30-35%. Severly enlarged right side of the heart. Meningitis spinal 1987 or 1988    Phobia     \"Phobia Of Needles\"    Shortness of breath on exertion    . Review of Systems - Review of Systems  Otherwise per HPI.     Allergies:  No Known Allergies    Past Surgical History:  Past Surgical History:   Procedure Laterality Date    KNEE ARTHROSCOPY  [id number]    rt knee lateral menisectomy    OTHER SURGICAL HISTORY  1997    \"Removal Inhaled  Paper Clips From Tonsils\"       Family History:  Family History   Problem Relation Age of Onset    Heart Disease Father         \"Heart Attack\"    Early Death Father 39    Substance Abuse Father         \"Drug And Alcohol Abuse\"    Other Father         \"Emphysema\"    Early Death Mother 39    Other Mother         \"Web toes on one foot\"    Substance Abuse Mother         \"Drugs And Alcohol\"    Substance Abuse Sister         \"Drugs And Alcohol\"    Cancer Sister         \"Ovarian Cancer\"    [de-identified] / [place] Sister        Social History:  Social History     Socioeconomic History    Marital status:      Spouse name: Not on file    Number of children: Not on file    Years of education: Not on file    Highest education level: Not on file   Occupational History    Occupation: /cook     Comment: [employer]   Tobacco Use    Smoking status: Former     Packs/day: 3.00     Years: 22.00     Pack years: 66.00     Types: Cigarettes     Start date:      Quit date: 2022     Years since quittin.8    Smokeless tobacco: Former     Quit date: 6/15/2002    Tobacco comments:      \"Occ chewing tobacco when quit in \" chews nicotine gum   Vaping Use    Vaping Use: Never used   Substance and Sexual Activity    Alcohol use: Yes     Comment: \"Occ\"    Drug use: Yes     Types: Marijuana Donne Barrera)     Comment: \"Occ Marijuana\"    Sexual activity: Yes     Partners: Female   Other Topics Concern    Not on file   Social History Narrative    Not on file     Social Determinants of Health     Financial Resource Strain: Low Risk     Difficulty of Paying Living Expenses: Not hard at all   Food Insecurity: No Food Insecurity    Worried About Running Out of Food in the Last Year: Never true    Ran Out of Food in the Last Year: Never true   Transportation Needs: Not on file   Physical Activity: Not on file   Stress: Not on file   Social Connections: Not on file   Intimate Partner Violence: Not on file   Housing Stability: Not on file         OBJECTIVE:  Physical Exam   Ht 6' 2\" (1.88 m)   Wt (!) 335 lb (152 kg)   BMI 43.01 kg/m²        NUTRITION DIAGNOSIS: Overweight / Obesity   Problem: Increased adiposity compared to reference standard or established norm   Etiology: Excess intake compared to output over time   S/S: Ht: 6\" Wt: 335 lb BMI: 43.01    NUTRITION INTERVENTIONS:    Individualized treatment goals to address nutrition diagnosis:   Instructed on 3974-1899 calorie diet for weight loss   Provided sample menus, healthy foods list, and plate model   Encouraged record keeping and physical activity as approved by physician    MONITORING/ EVALUATION/ PLAN:   Pt verbalized understanding of all materials covered   Pt asked pertinent questions throughout the session - expect compliance with nutrition guidelines presented   Provided pt with contact information should questions arise prior to next visit   Will f/u with pt weekly    Shelbi Enriquez, RD, LD

## 2023-01-12 ENCOUNTER — PROCEDURE VISIT (OUTPATIENT)
Dept: CARDIOLOGY CLINIC | Age: 38
End: 2023-01-12
Payer: COMMERCIAL

## 2023-01-12 ENCOUNTER — TELEPHONE (OUTPATIENT)
Dept: CARDIOLOGY CLINIC | Age: 38
End: 2023-01-12

## 2023-01-12 ENCOUNTER — HOSPITAL ENCOUNTER (OUTPATIENT)
Age: 38
Discharge: HOME | End: 2023-01-12
Payer: COMMERCIAL

## 2023-01-12 DIAGNOSIS — I42.9 CARDIOMYOPATHY, UNSPECIFIED TYPE (HCC): ICD-10-CM

## 2023-01-12 DIAGNOSIS — R94.39 ABNORMAL STRESS TEST: ICD-10-CM

## 2023-01-12 DIAGNOSIS — I10 ESSENTIAL HYPERTENSION: ICD-10-CM

## 2023-01-12 DIAGNOSIS — R60.9 EDEMA, UNSPECIFIED TYPE: Primary | ICD-10-CM

## 2023-01-12 DIAGNOSIS — E78.5 DYSLIPIDEMIA: ICD-10-CM

## 2023-01-12 LAB
ABO/RH: NORMAL
ANTIBODY SCREEN: NEGATIVE
CHOLESTEROL, FASTING: 96 MG/DL
COMMENT: NORMAL
HDLC SERPL-MCNC: 27 MG/DL
LDL CHOLESTEROL CALCULATED: 55 MG/DL
TRIGLYCERIDE, FASTING: 71 MG/DL

## 2023-01-12 PROCEDURE — 86900 BLOOD TYPING SEROLOGIC ABO: CPT

## 2023-01-12 PROCEDURE — 86850 RBC ANTIBODY SCREEN: CPT

## 2023-01-12 PROCEDURE — 36415 COLL VENOUS BLD VENIPUNCTURE: CPT

## 2023-01-12 PROCEDURE — 80061 LIPID PANEL: CPT

## 2023-01-12 PROCEDURE — 86901 BLOOD TYPING SEROLOGIC RH(D): CPT

## 2023-01-12 PROCEDURE — 93970 EXTREMITY STUDY: CPT | Performed by: INTERNAL MEDICINE

## 2023-01-12 NOTE — TELEPHONE ENCOUNTER
1/9/2023  ECHO      Summary   Left ventricular function is severely abnormal , EF is estimated at 30-35%. Left ventricle size is normal.   Global hypokinesis noted. Moderate left ventricular hypertrophy. Grade II diastolic dysfunction. Moderately dilated left atrium. Severly enlarged right side of the heart. Mild mitral and moderate tricuspid regurgitation is present. Moderate Pulmonary hypertension with RVSP of 50mmHg. No evidence of pericardial effusion. Large Left pleural effusion. 1/9/2023 NM    Summary    Medium sized defect of moderate severity which is reversible involving    anterolateral wall of myocardium.     Low EF 32 %        Recommendation    abnormal stress test with anterolateral wall ischemia and pathcy    distrubition of radiotracer        Recommend LHC/RHC - Please follow up as outpt to discuss results       PT KNOWS RESULTS AND IS SCHEDULED FOR HEART CATH

## 2023-01-13 ENCOUNTER — HOSPITAL ENCOUNTER (OUTPATIENT)
Dept: CARDIAC CATH/INVASIVE PROCEDURES | Age: 38
Discharge: HOME | End: 2023-01-13
Attending: INTERNAL MEDICINE | Admitting: INTERNAL MEDICINE
Payer: COMMERCIAL

## 2023-01-13 VITALS
HEIGHT: 74 IN | BODY MASS INDEX: 40.43 KG/M2 | SYSTOLIC BLOOD PRESSURE: 132 MMHG | DIASTOLIC BLOOD PRESSURE: 84 MMHG | TEMPERATURE: 96.2 F | RESPIRATION RATE: 18 BRPM | WEIGHT: 315 LBS | HEART RATE: 92 BPM | OXYGEN SATURATION: 93 %

## 2023-01-13 PROBLEM — I50.23 ACUTE ON CHRONIC SYSTOLIC CONGESTIVE HEART FAILURE (HCC): Status: ACTIVE | Noted: 2023-01-13

## 2023-01-13 LAB
ALBUMIN SERPL-MCNC: 3.8 GM/DL (ref 3.4–5)
ALP BLD-CCNC: 118 IU/L (ref 40–129)
ALT SERPL-CCNC: 14 U/L (ref 10–40)
ANION GAP SERPL CALCULATED.3IONS-SCNC: 8 MMOL/L (ref 4–16)
APTT: 32.1 SECONDS (ref 25.1–37.1)
AST SERPL-CCNC: 22 IU/L (ref 15–37)
BILIRUB SERPL-MCNC: 2.1 MG/DL (ref 0–1)
BUN BLDV-MCNC: 19 MG/DL (ref 6–23)
CALCIUM SERPL-MCNC: 9 MG/DL (ref 8.3–10.6)
CHLORIDE BLD-SCNC: 100 MMOL/L (ref 99–110)
CO2: 31 MMOL/L (ref 21–32)
CREAT SERPL-MCNC: 0.7 MG/DL (ref 0.9–1.3)
GFR SERPL CREATININE-BSD FRML MDRD: >60 ML/MIN/1.73M2
GLUCOSE BLD-MCNC: 100 MG/DL (ref 70–99)
HCT VFR BLD CALC: 37.3 % (ref 42–52)
HEMOGLOBIN: 11.6 GM/DL (ref 13.5–18)
INR BLD: 1.23 INDEX
MCH RBC QN AUTO: 28.2 PG (ref 27–31)
MCHC RBC AUTO-ENTMCNC: 31.1 % (ref 32–36)
MCV RBC AUTO: 90.5 FL (ref 78–100)
PDW BLD-RTO: 17.9 % (ref 11.7–14.9)
PLATELET # BLD: 197 K/CU MM (ref 140–440)
PMV BLD AUTO: 10.2 FL (ref 7.5–11.1)
POTASSIUM SERPL-SCNC: 4.6 MMOL/L (ref 3.5–5.1)
PROTHROMBIN TIME: 15.9 SECONDS (ref 11.7–14.5)
RBC # BLD: 4.12 M/CU MM (ref 4.6–6.2)
SODIUM BLD-SCNC: 139 MMOL/L (ref 135–145)
TOTAL PROTEIN: 7.8 GM/DL (ref 6.4–8.2)
WBC # BLD: 7.2 K/CU MM (ref 4–10.5)

## 2023-01-13 PROCEDURE — 93460 R&L HRT ART/VENTRICLE ANGIO: CPT | Performed by: INTERNAL MEDICINE

## 2023-01-13 PROCEDURE — 85027 COMPLETE CBC AUTOMATED: CPT

## 2023-01-13 PROCEDURE — 2709999900 HC NON-CHARGEABLE SUPPLY

## 2023-01-13 PROCEDURE — C1769 GUIDE WIRE: HCPCS

## 2023-01-13 PROCEDURE — 6360000002 HC RX W HCPCS

## 2023-01-13 PROCEDURE — 85730 THROMBOPLASTIN TIME PARTIAL: CPT

## 2023-01-13 PROCEDURE — C1894 INTRO/SHEATH, NON-LASER: HCPCS

## 2023-01-13 PROCEDURE — 6360000004 HC RX CONTRAST MEDICATION

## 2023-01-13 PROCEDURE — 80053 COMPREHEN METABOLIC PANEL: CPT

## 2023-01-13 PROCEDURE — C1751 CATH, INF, PER/CENT/MIDLINE: HCPCS

## 2023-01-13 PROCEDURE — 2500000003 HC RX 250 WO HCPCS

## 2023-01-13 PROCEDURE — 85610 PROTHROMBIN TIME: CPT

## 2023-01-13 PROCEDURE — 6370000000 HC RX 637 (ALT 250 FOR IP): Performed by: INTERNAL MEDICINE

## 2023-01-13 PROCEDURE — 93460 R&L HRT ART/VENTRICLE ANGIO: CPT

## 2023-01-13 RX ORDER — 0.9 % SODIUM CHLORIDE 0.9 %
500 INTRAVENOUS SOLUTION INTRAVENOUS ONCE
Status: DISCONTINUED | OUTPATIENT
Start: 2023-01-13 | End: 2023-01-13 | Stop reason: HOSPADM

## 2023-01-13 RX ORDER — DIPHENHYDRAMINE HCL 25 MG
25 TABLET ORAL ONCE
Status: COMPLETED | OUTPATIENT
Start: 2023-01-13 | End: 2023-01-13

## 2023-01-13 RX ORDER — HYDRALAZINE HYDROCHLORIDE 20 MG/ML
10 INJECTION INTRAMUSCULAR; INTRAVENOUS EVERY 10 MIN PRN
Status: DISCONTINUED | OUTPATIENT
Start: 2023-01-13 | End: 2023-01-13 | Stop reason: HOSPADM

## 2023-01-13 RX ORDER — SODIUM CHLORIDE 9 MG/ML
INJECTION, SOLUTION INTRAVENOUS PRN
Status: DISCONTINUED | OUTPATIENT
Start: 2023-01-13 | End: 2023-01-13 | Stop reason: HOSPADM

## 2023-01-13 RX ORDER — SODIUM CHLORIDE 0.9 % (FLUSH) 0.9 %
5-40 SYRINGE (ML) INJECTION EVERY 12 HOURS SCHEDULED
Status: DISCONTINUED | OUTPATIENT
Start: 2023-01-13 | End: 2023-01-13 | Stop reason: HOSPADM

## 2023-01-13 RX ORDER — SODIUM CHLORIDE 0.9 % (FLUSH) 0.9 %
5-40 SYRINGE (ML) INJECTION PRN
Status: DISCONTINUED | OUTPATIENT
Start: 2023-01-13 | End: 2023-01-13 | Stop reason: HOSPADM

## 2023-01-13 RX ORDER — DIAZEPAM 5 MG/1
5 TABLET ORAL ONCE
Status: COMPLETED | OUTPATIENT
Start: 2023-01-13 | End: 2023-01-13

## 2023-01-13 RX ORDER — ACETAMINOPHEN 325 MG/1
650 TABLET ORAL EVERY 4 HOURS PRN
Status: DISCONTINUED | OUTPATIENT
Start: 2023-01-13 | End: 2023-01-13 | Stop reason: HOSPADM

## 2023-01-13 RX ADMIN — DIAZEPAM 5 MG: 5 TABLET ORAL at 10:13

## 2023-01-13 RX ADMIN — DIPHENHYDRAMINE HYDROCHLORIDE 25 MG: 25 TABLET ORAL at 10:13

## 2023-01-13 NOTE — PROGRESS NOTES
Pt returned to cath lab holding area room 4; family at bedside.  Right radial site intact without bleeding or hematoma

## 2023-01-13 NOTE — PROGRESS NOTES
Dc instructions reviewed with pt and family, pt verbalizes understanding. Pt ambulated to br and voided returned to room r radial drsg dry and intact no hematoma.   Pt changed into clothes

## 2023-01-13 NOTE — FLOWSHEET NOTE
01/13/23 1328   Puncture Site Assessment 1   Location Radial - right   Site Assessment No redness, drainage, swelling or hematoma   Dressing Applied Transparent occlusive dressing   TR Band removed at this time per RUTHANN Blanc Right radial site free of bleeding/hematoma. Tegaderm applied to site. Arm board remains secured with kerlex as reminder to pt to minimize use of right arm.

## 2023-01-18 NOTE — H&P
Rachel Gupta MD                                  CARDIOLOGY  NOTE           Chief Complaint:       ADAMS    HPI:     Palomo Reese is a 30y.o. year old male who presents to the clinic with chief complaint of shortness of breath lower extremity edema    Patient is 6 feet 2 inches and 386 pounds. Mentions that over the past several months he has been diagnosed with diabetes mellitus, has been diagnosed with lower extremity edema and is on Lasix. Patient mentions that he has been experiencing shortness of breath with little exertion as well as lower extremity edema. Denies any PND orthopnea. Patient has been previously diagnosed with sleep apnea however is noncompliant with sleep apnea  Patient is a former smoker who quit 9 months ago. Smokes marijuana 2-3 times per week. Denies any alcohol abuse. Family history is positive for premature CAD in his father who  at the age of 39 from heart attack, also was a drug abuser. Patient first cousin had congenital heart disease which was operated upon. EKG in the office today shows sinus rhythm, sinus tachycardia    No current facility-administered medications for this encounter.      Current Outpatient Medications   Medication Sig Dispense Refill    furosemide (LASIX) 40 MG tablet Take 1.5 tablets by mouth daily 30 tablet 1    Ascorbic Acid (VITAMIN C) 250 MG tablet Take 250 mg by mouth daily      Cetirizine HCl (ZYRTEC PO) Take by mouth      metoprolol succinate (TOPROL XL) 25 MG extended release tablet Take 1 tablet by mouth daily 30 tablet 3    spironolactone (ALDACTONE) 25 MG tablet Take 1 tablet by mouth daily 90 tablet 1    empagliflozin (JARDIANCE) 10 MG tablet Take 1 tablet by mouth daily 90 tablet 1    sacubitril-valsartan (ENTRESTO) 24-26 MG per tablet Take 1 tablet by mouth 2 times daily Start Friday after stopping lisinopril today 60 tablet 3    Alcohol Swabs PADS USE AS DIRECTED TO CLEAN SKIN PRIOR TO CHECKING BLOOD GLUCOSE 100 each 0    Prodigy Twist Top Lancets 28G MISC TEST BLOOD GLUCOSE ONCE A  each 1    metFORMIN (GLUCOPHAGE-XR) 500 MG extended release tablet Take 1 tablet by mouth 2 times daily (with meals) 60 tablet 3    benzonatate (TESSALON) 200 MG capsule Take 1 capsule by mouth 2 times daily as needed for Cough 30 capsule 0    fluticasone-umeclidin-vilant (TRELEGY ELLIPTA) 100-62.5-25 MCG/ACT AEPB inhaler Inhale 1 puff into the lungs daily 1 each 1    albuterol sulfate HFA (VENTOLIN HFA) 108 (90 Base) MCG/ACT inhaler Inhale 2 puffs into the lungs every 4 hours as needed for Wheezing or Shortness of Breath 2 each 3    sertraline (ZOLOFT) 100 MG tablet Take 1 tablet by mouth daily 30 tablet 3    blood glucose test strips (PRODIGY NO CODING BLOOD GLUC) strip TEST BLOOD GLUCOSE ONCE A DAY & AS NEEDED FOR SYMPTOMS OF IRREGULAR BLOOD GLUCOSE. 100 strip 1    Blood Pressure KIT Check blood pressure daily 1 kit 0    glucose monitoring (FREESTYLE FREEDOM) kit 1 kit by Does not apply route daily 1 kit 0       Allergies:     Patient has no known allergies. Patient History:    Past Medical History:   Diagnosis Date    Anxiety     \"About Surgery\"    Asthma     \"As a child, no problems since then\"    Chronic back pain     Foot callus     \"Bilateral\"    Headache(784.0)     \"Use to have intense headaches\"    History of nuclear stress test 01/09/2023    Low EF 32 %Medium sized defect of moderate severity which is reversible involving anterolateral wall of myocardium    History of stress test 01/09/2023    Medium sized defect of moderate severity which is reversible involving anterolateral wall of myocardium. EF 32 %    Hx of echocardiogram 01/09/2023    EF is estimated at 30-35%. Severly enlarged right side of the heart. Hx of echocardiogram 01/09/2023    EF is estimated at 30-35%Severly enlarged right side of the heart. Grade II diastolic dysfunction.     Meningitis spinal 1987 or 1988    Phobia     \"Phobia Of Needles\"    Shortness of breath on exertion VL DUP LOWER EXTREMITY VENOUS BILATERAL 2023    Significant reflux noted of the Right CFV (1.4s), GSV Mid Thigh (1.2s), andGSV Distal Calf (1.2s). Significant reflux noted in the Left Mid FV (1.0s), GSV Knee (0.6s), and GSV Distal Calf (4.2s). Past Surgical History:   Procedure Laterality Date    KNEE ARTHROSCOPY  [id number]    rt knee lateral menisectomy    OTHER SURGICAL HISTORY      \"Removal Inhaled  Paper Clips From Tonsils\"     Family History   Problem Relation Age of Onset    Heart Disease Father         \"Heart Attack\"    Early Death Father 39    Substance Abuse Father         \"Drug And Alcohol Abuse\"    Other Father         \"Emphysema\"    Early Death Mother 39    Other Mother         \"Web toes on one foot\"    Substance Abuse Mother         \"Drugs And Alcohol\"    Substance Abuse Sister         \"Drugs And Alcohol\"    Cancer Sister         \"Ovarian Cancer\"    Miscarriages / Stillbirths Sister      Social History     Tobacco Use    Smoking status: Former     Packs/day: 3.00     Years: 22.00     Pack years: 66.00     Types: Cigarettes     Start date:      Quit date: 2022     Years since quittin.8    Smokeless tobacco: Former     Quit date: 6/15/2002    Tobacco comments: \"Occ chewing tobacco when quit in \" chews nicotine gum   Substance Use Topics    Alcohol use: Yes     Comment: \"Occ\"        Review of Systems:     Constitutional:  No Fever or Weight Loss   Eyes: No Decreased Vision  ENT: No Headaches, Hearing Loss or Vertigo  Cardiovascular: No Chest Pain,  +++  Shortness of breath, No Palpitations. No Edema   Respiratory: No cough or wheezing .  No Respiratory distress   Gastrointestinal: No abdominal pain, appetite loss, blood in stools, constipation, diarrhea or heartburn  Genitourinary: No dysuria, trouble voiding, or hematuria  Musculoskeletal:  denies any new  joint aches , or pain   Integumentary: No rash or pruritis  Neurological: No TIA or stroke symptoms  Psychiatric: No anxiety or depression  Endocrine: No malaise, fatigue or temperature intolerance  Hematologic/Lymphatic: No bleeding problems, blood clots or swollen lymph nodes  Allergic/Immunologic: No nasal congestion or hives        Objective:      Physical Exam:    /84   Pulse 92   Temp (!) 96.2 °F (35.7 °C) (Temporal)   Resp 18   Ht 6' 2\" (1.88 m)   Wt (!) 338 lb (153.3 kg)   SpO2 93%   BMI 43.40 kg/m²   Wt Readings from Last 3 Encounters:   01/13/23 (!) 338 lb (153.3 kg)   01/11/23 (!) 335 lb (152 kg)   01/10/23 (!) 338 lb 9.6 oz (153.6 kg)     Body mass index is 43.4 kg/m². Vitals:    01/13/23 1350   BP: 132/84   Pulse: 92   Resp:    Temp:    SpO2:         General Appearance and Constitutional: Conversant, Well developed, Well nourished, No acute distress, Non-toxic appearance. HEENT:  Normocephalic, Atraumatic, Bilateral external ears normal, Oropharynx moist, No oral exudates,   Nose normal.   Neck- Normal range of motion, No tenderness, Supple  Eyes:   Conjunctiva normal, No discharge. Respiratory:  Normal breath sounds, No respiratory distress, No wheezing, No Rales, No Ronchi. No chest tenderness. Cardiovascular: S1-S2, no added heart sounds, No Mumurs appreciated. No gallops, rubs. ++  Pedal Edema   GI:  Bowel sounds normal, Soft, No tenderness,  :  No costovertebral angle tenderness   Musculoskeletal:  No gross deformities.  Back- No tenderness  Integument:  Well hydrated, no rash   Lymphatic:  No lymphadenopathy noted   Neurologic:  Alert & oriented x 3, Normal motor function, normal sensory function, no focal deficits noted   Psychiatric:  Speech and behavior appropriate       Medical decision making and Data review:    DATA:    No results found for: TROPONINT  BNP:    Lab Results   Component Value Date    PROBNP 4,725 (H) 12/15/2022     PT/INR:  No results found for: [employer]  Lab Results   Component Value Date    LABA1C 6.6 12/15/2022    LABA1C 6.6 08/26/2022     Lab Results   Component Value Date    CHOL 160 07/26/2017    TRIG 369 (H) 07/26/2017    HDL 27 (L) 01/12/2023    LDLCALC 55 01/12/2023    LDLDIRECT 100 (H) 05/19/2014     Lab Results   Component Value Date    WBC 7.2 01/13/2023    HGB 11.6 (L) 01/13/2023    HCT 37.3 (L) 01/13/2023    MCV 90.5 01/13/2023     01/13/2023     TSH:   Lab Results   Component Value Date    TSH 3.37 12/15/2022     Lab Results   Component Value Date    AST 22 01/13/2023    ALT 14 01/13/2023    BILITOT 2.1 (H) 01/13/2023    ALKPHOS 118 01/13/2023         All labs, medications and tests reviewed by myself including data and history from outside source , patient and available family . No diagnosis found. Impression and Plan:      Shortness of breath with exertion  Congestive heart failure, Acute on Ch. Systolic   Severe cardiomyopathy with LV ejection fraction of 30%. Bilateral lower extremity edema +2      Unclear etiology of cardiomyopathy    patient for left and right heart cardiac catheterization  Stress test indicates anterior lateral ischemia, dilated cardiomyopathy, echocardiogram also shows severe cardiomyopathy.

## 2023-01-22 DIAGNOSIS — E11.9 TYPE 2 DIABETES MELLITUS WITHOUT COMPLICATION, WITHOUT LONG-TERM CURRENT USE OF INSULIN (HCC): ICD-10-CM

## 2023-01-23 RX ORDER — METFORMIN HYDROCHLORIDE 500 MG/1
TABLET, EXTENDED RELEASE ORAL
Qty: 60 TABLET | Refills: 3 | Status: SHIPPED | OUTPATIENT
Start: 2023-01-23

## 2023-01-25 ENCOUNTER — OFFICE VISIT (OUTPATIENT)
Dept: CARDIOLOGY CLINIC | Age: 38
End: 2023-01-25
Payer: COMMERCIAL

## 2023-01-25 VITALS
HEIGHT: 74 IN | WEIGHT: 280 LBS | BODY MASS INDEX: 35.94 KG/M2 | SYSTOLIC BLOOD PRESSURE: 124 MMHG | HEART RATE: 76 BPM | DIASTOLIC BLOOD PRESSURE: 66 MMHG

## 2023-01-25 DIAGNOSIS — I27.20 PULMONARY HTN (HCC): ICD-10-CM

## 2023-01-25 DIAGNOSIS — R06.02 SHORTNESS OF BREATH: ICD-10-CM

## 2023-01-25 DIAGNOSIS — I42.8 NON-ISCHEMIC CARDIOMYOPATHY (HCC): ICD-10-CM

## 2023-01-25 DIAGNOSIS — R06.09 DOE (DYSPNEA ON EXERTION): Primary | ICD-10-CM

## 2023-01-25 DIAGNOSIS — I10 ESSENTIAL HYPERTENSION: ICD-10-CM

## 2023-01-25 DIAGNOSIS — I50.22 CHRONIC SYSTOLIC HEART FAILURE (HCC): ICD-10-CM

## 2023-01-25 DIAGNOSIS — E78.5 DYSLIPIDEMIA: ICD-10-CM

## 2023-01-25 PROCEDURE — 3078F DIAST BP <80 MM HG: CPT | Performed by: INTERNAL MEDICINE

## 2023-01-25 PROCEDURE — 99214 OFFICE O/P EST MOD 30 MIN: CPT | Performed by: INTERNAL MEDICINE

## 2023-01-25 PROCEDURE — 3074F SYST BP LT 130 MM HG: CPT | Performed by: INTERNAL MEDICINE

## 2023-01-25 RX ORDER — ATORVASTATIN CALCIUM 20 MG/1
20 TABLET, FILM COATED ORAL DAILY
Qty: 90 TABLET | Refills: 1 | Status: SHIPPED | OUTPATIENT
Start: 2023-01-25

## 2023-01-25 NOTE — PROGRESS NOTES
Carolina Cohen MD                                  CARDIOLOGY  NOTE           Chief Complaint:    Chief Complaint   Patient presents with    Follow-up     Pt denies any new cardiac sx no surgeries or procedures scheduled that he is aware of       Doing better  Voiding well  Lost 90+ pounds  Breathing improved       LHC/RHC 2023      Severe non ischemic CMP with dilated RV and high LVEDP, PA, RA   pressures   Echo suggests RV dilation / Biventricular Failure      Recommendations   Obtain VQ scan as outpt   Will consider IR evaluation for thoracocentesis   Cont GDMT   CHF follow up      HPI:     Kellen Menchaca is a 30y.o. year old male who presents to the clinic with chief complaint of shortness of breath lower extremity edema    Patient is 6 feet 2 inches and 386 pounds. Mentions that over the past several months he has been diagnosed with diabetes mellitus, has been diagnosed with lower extremity edema and is on Lasix. Patient mentions that he has been experiencing shortness of breath with little exertion as well as lower extremity edema. Denies any PND orthopnea. Patient has been previously diagnosed with sleep apnea however is noncompliant with sleep apnea  Patient is a former smoker who quit 9 months ago. Smokes marijuana 2-3 times per week. Denies any alcohol abuse. Family history is positive for premature CAD in his father who  at the age of 39 from heart attack, also was a drug abuser. Patient first cousin had congenital heart disease which was operated upon.     EKG in the office today shows sinus rhythm, sinus tachycardia    Current Outpatient Medications   Medication Sig Dispense Refill    sacubitril-valsartan (ENTRESTO) 49-51 MG per tablet Take 1 tablet by mouth 2 times daily 60 tablet 3    atorvastatin (LIPITOR) 20 MG tablet Take 1 tablet by mouth daily 90 tablet 1    metFORMIN (GLUCOPHAGE-XR) 500 MG extended release tablet TAKE 1 TABLET BY MOUTH TWO TIMES A DAY WITH MEALS 60 tablet 3 furosemide (LASIX) 40 MG tablet Take 1.5 tablets by mouth daily 30 tablet 1    Ascorbic Acid (VITAMIN C) 250 MG tablet Take 250 mg by mouth daily      Cetirizine HCl (ZYRTEC PO) Take by mouth      metoprolol succinate (TOPROL XL) 25 MG extended release tablet Take 1 tablet by mouth daily 30 tablet 3    spironolactone (ALDACTONE) 25 MG tablet Take 1 tablet by mouth daily 90 tablet 1    empagliflozin (JARDIANCE) 10 MG tablet Take 1 tablet by mouth daily 90 tablet 1    Alcohol Swabs PADS USE AS DIRECTED TO CLEAN SKIN PRIOR TO CHECKING BLOOD GLUCOSE 100 each 0    Prodigy Twist Top Lancets 28G MISC TEST BLOOD GLUCOSE ONCE A  each 1    fluticasone-umeclidin-vilant (TRELEGY ELLIPTA) 100-62.5-25 MCG/ACT AEPB inhaler Inhale 1 puff into the lungs daily 1 each 1    albuterol sulfate HFA (VENTOLIN HFA) 108 (90 Base) MCG/ACT inhaler Inhale 2 puffs into the lungs every 4 hours as needed for Wheezing or Shortness of Breath 2 each 3    sertraline (ZOLOFT) 100 MG tablet Take 1 tablet by mouth daily 30 tablet 3    blood glucose test strips (PRODIGY NO CODING BLOOD GLUC) strip TEST BLOOD GLUCOSE ONCE A DAY & AS NEEDED FOR SYMPTOMS OF IRREGULAR BLOOD GLUCOSE. 100 strip 1    Blood Pressure KIT Check blood pressure daily 1 kit 0    glucose monitoring (FREESTYLE FREEDOM) kit 1 kit by Does not apply route daily 1 kit 0     No current facility-administered medications for this visit. Allergies:     Patient has no known allergies.     Patient History:    Past Medical History:   Diagnosis Date    Anxiety     \"About Surgery\"    Asthma     \"As a child, no problems since then\"    Chronic back pain     Foot callus     \"Bilateral\"    Headache(784.0)     \"Use to have intense headaches\"    History of nuclear stress test 01/09/2023    Low EF 32 %Medium sized defect of moderate severity which is reversible involving anterolateral wall of myocardium    History of stress test 01/09/2023    Medium sized defect of moderate severity which is reversible involving anterolateral wall of myocardium. EF 32 %    Hx of echocardiogram 2023    EF is estimated at 30-35%. Severly enlarged right side of the heart. Hx of echocardiogram 2023    EF is estimated at 30-35%Severly enlarged right side of the heart. Grade II diastolic dysfunction. Meningitis spinal  or     Phobia     \"Phobia Of Needles\"    Shortness of breath on exertion     VL DUP LOWER EXTREMITY VENOUS BILATERAL 2023    Significant reflux noted of the Right CFV (1.4s), GSV Mid Thigh (1.2s), andGSV Distal Calf (1.2s). Significant reflux noted in the Left Mid FV (1.0s), GSV Knee (0.6s), and GSV Distal Calf (4.2s). Past Surgical History:   Procedure Laterality Date    KNEE ARTHROSCOPY  [id number]    rt knee lateral menisectomy    OTHER SURGICAL HISTORY      \"Removal Inhaled  Paper Clips From Tonsils\"     Family History   Problem Relation Age of Onset    Heart Disease Father         \"Heart Attack\"    Early Death Father 39    Substance Abuse Father         \"Drug And Alcohol Abuse\"    Other Father         \"Emphysema\"    Early Death Mother 39    Other Mother         \"Web toes on one foot\"    Substance Abuse Mother         \"Drugs And Alcohol\"    Substance Abuse Sister         \"Drugs And Alcohol\"    Cancer Sister         \"Ovarian Cancer\"    Miscarriages / Stillbirths Sister      Social History     Tobacco Use    Smoking status: Former     Packs/day: 3.00     Years: 22.00     Pack years: 66.00     Types: Cigarettes     Start date:      Quit date: 2022     Years since quittin.9    Smokeless tobacco: Former     Quit date: 6/15/2002    Tobacco comments:      \"Occ chewing tobacco when quit in 2002\" chews nicotine gum   Substance Use Topics    Alcohol use: Yes     Comment: \"Occ\"        Review of Systems:     Constitutional:  No Fever or Weight Loss   Eyes: No Decreased Vision  ENT: No Headaches, Hearing Loss or Vertigo  Cardiovascular: No Chest Pain,  +++  Shortness of breath, No Palpitations. No Edema   Respiratory: No cough or wheezing . No Respiratory distress   Gastrointestinal: No abdominal pain, appetite loss, blood in stools, constipation, diarrhea or heartburn  Genitourinary: No dysuria, trouble voiding, or hematuria  Musculoskeletal:  denies any new  joint aches , or pain   Integumentary: No rash or pruritis  Neurological: No TIA or stroke symptoms  Psychiatric: No anxiety or depression  Endocrine: No malaise, fatigue or temperature intolerance  Hematologic/Lymphatic: No bleeding problems, blood clots or swollen lymph nodes  Allergic/Immunologic: No nasal congestion or hives        Objective:      Physical Exam:    /66 (Site: Right Upper Arm, Position: Sitting, Cuff Size: Large Adult)   Pulse 76   Ht 6' 2\" (1.88 m)   Wt 280 lb (127 kg)   BMI 35.95 kg/m²   Wt Readings from Last 3 Encounters:   01/25/23 280 lb (127 kg)   01/13/23 (!) 338 lb (153.3 kg)   01/11/23 (!) 335 lb (152 kg)     Body mass index is 35.95 kg/m². Vitals:    01/25/23 1158   BP: 124/66   Pulse: 76        General Appearance and Constitutional: Conversant, Well developed, Well nourished, No acute distress, Non-toxic appearance. HEENT:  Normocephalic, Atraumatic, Bilateral external ears normal, Oropharynx moist, No oral exudates,   Nose normal.   Neck- Normal range of motion, No tenderness, Supple  Eyes:   Conjunctiva normal, No discharge. Respiratory:  Normal breath sounds, No respiratory distress, No wheezing, No Rales, No Ronchi. No chest tenderness. Cardiovascular: S1-S2, no added heart sounds, No Mumurs appreciated. No gallops, rubs. ++  Pedal Edema   GI:  Bowel sounds normal, Soft, No tenderness,  :  No costovertebral angle tenderness   Musculoskeletal:  No gross deformities.  Back- No tenderness  Integument:  Well hydrated, no rash   Lymphatic:  No lymphadenopathy noted   Neurologic:  Alert & oriented x 3, Normal motor function, normal sensory function, no focal deficits noted Psychiatric:  Speech and behavior appropriate       Medical decision making and Data review:    DATA:    No results found for: TROPONINT  BNP:    Lab Results   Component Value Date    PROBNP 4,725 (H) 12/15/2022     PT/INR:  No results found for: PTINR  Lab Results   Component Value Date    LABA1C 6.6 12/15/2022    LABA1C 6.6 08/26/2022     Lab Results   Component Value Date    CHOL 160 07/26/2017    TRIG 369 (H) 07/26/2017    HDL 27 (L) 01/12/2023    LDLCALC 55 01/12/2023    LDLDIRECT 100 (H) 05/19/2014     Lab Results   Component Value Date    WBC 7.2 01/13/2023    HGB 11.6 (L) 01/13/2023    HCT 37.3 (L) 01/13/2023    MCV 90.5 01/13/2023     01/13/2023     TSH:   Lab Results   Component Value Date    TSH 3.37 12/15/2022     Lab Results   Component Value Date    AST 22 01/13/2023    ALT 14 01/13/2023    BILITOT 2.1 (H) 01/13/2023    ALKPHOS 118 01/13/2023         All labs, medications and tests reviewed by myself including data and history from outside source , patient and available family . 1. ADAMS (dyspnea on exertion)    2. Non-ischemic cardiomyopathy ([facility] [address]    3. Essential hypertension    4. Dyslipidemia    5. Shortness of breath    6. Pulmonary HTN ([facility] [address]    7. Chronic systolic heart failure (HCC)             Impression and Plan:      Shortness of breath with exertion  Congestive heart failure, Acute on Ch. Systolic, biventricular failure  Severe nonischemic cardiomyopathy with LV ejection fraction of 30%. History of COVID-19 infection 2022, symptoms started soon after    Bilateral lower extremity edema +2    Left and right heart cardiac catheterization performed on 1/3/2023. Severe nonischemic cardiomyopathy with dilated RV and elevated LVEDP PA RA pressures noted.     Will obtain VQ scan to rule out chronic thromboembolic disease  Continue with aggressive guideline directed medical therapy    Continue with Toprol-XL 25 mg daily  Continue with Aldactone 25 daily  Continue with Benny Kwan, will increase his dose to 49/51 mg p.o. twice daily  Continue with diuretics Lasix 60 mg daily  Continue with Jardiance 10 mg daily  Start Lipitor 20 daily     Restrict fluid to less than 1500 cc/day  Patient was counseled extensively to give up salt food, prefer home based food. Essential hypertension: Blood pressure meds adjustment as above    Diabetes mellitus: Patient is on antidiabetic medications. Management primary team    Obstructive sleep apnea: Patient has been diagnosed with sleep apnea in the past however noncompliant with CPAP. He was urged to restart his CPAP. He has an appointment to see pulmonologist.    Morbid obesity with BMI of 35.95. Low-salt low-fat diet and exercise as tolerated patient is following up with weight loss clinic already. Diabetes mellitus: On metformin. Start Jardiance    Hyperlipidemia: Cholesterol levels well controlled. Total cholesterol 96 HDL 27 LDL 55. Start low-dose statins given history of diabetes mellitus, and otherwise high risk profile    Former smoker      Return in about 3 months (around 4/25/2023). Counseled extensively and medication compliance urged. We discussed that for the  prevention of ASCVD our  goal is aggressive risk modification. Patient is encouraged to exercise even a brisk walk for 30 minutes  at least 3 to 4 times a week   Various goals were discussed and questions answered. Continue current medications. Appropriate prescriptions are addressed and refills ordered. Questions answered and patient verbalizes understanding. Call for any problems, questions, or concerns.

## 2023-01-26 DIAGNOSIS — R06.02 SHORTNESS OF BREATH: Primary | ICD-10-CM

## 2023-01-30 ENCOUNTER — HOSPITAL ENCOUNTER (OUTPATIENT)
Dept: SLEEP CENTER | Age: 38
Discharge: HOME | End: 2023-01-30
Payer: COMMERCIAL

## 2023-01-30 DIAGNOSIS — G47.33 OBSTRUCTIVE SLEEP APNEA: ICD-10-CM

## 2023-01-30 PROCEDURE — G0398 HOME SLEEP TEST/TYPE 2 PORTA: HCPCS

## 2023-01-30 NOTE — PROGRESS NOTES
Velia Hays  1985  [facility] for set up and instruction of home sleep study with the [facility] unit. he was instructed on proper set-up and operation of HST unit. Written instructions with set up diagram given for reference and reinforcement of verbal instruction and demonstration. he was able to return demonstration appropriately. No home environment, vision, dexterity, comprehension concerns with this patient based on completed forms and patient interactions. Patient will return unit after 2 nights as instructed.     Electronically signed by Galina Wayne on 1/30/2023 at 9:51 AM

## 2023-01-31 ENCOUNTER — TELEPHONE (OUTPATIENT)
Dept: CARDIOLOGY CLINIC | Age: 38
End: 2023-01-31

## 2023-01-31 NOTE — TELEPHONE ENCOUNTER
We rec'vd the same forms that we send to [facility] that was completed and faxed on 1/24/2023. I called [facility], talked to Anette, to see if they needed anything else, she said they do not need anything at this time.

## 2023-02-01 ENCOUNTER — HOSPITAL ENCOUNTER (OUTPATIENT)
Age: 38
Discharge: HOME | End: 2023-02-01
Payer: COMMERCIAL

## 2023-02-01 ENCOUNTER — HOSPITAL ENCOUNTER (OUTPATIENT)
Dept: GENERAL RADIOLOGY | Age: 38
Discharge: HOME | End: 2023-02-01
Payer: COMMERCIAL

## 2023-02-01 ENCOUNTER — HOSPITAL ENCOUNTER (OUTPATIENT)
Dept: NUCLEAR MEDICINE | Age: 38
Discharge: HOME | End: 2023-02-01
Payer: COMMERCIAL

## 2023-02-01 DIAGNOSIS — R06.09 DOE (DYSPNEA ON EXERTION): ICD-10-CM

## 2023-02-01 DIAGNOSIS — R06.02 SHORTNESS OF BREATH: ICD-10-CM

## 2023-02-01 PROCEDURE — A9540 TC99M MAA: HCPCS | Performed by: FAMILY MEDICINE

## 2023-02-01 PROCEDURE — 3430000000 HC RX DIAGNOSTIC RADIOPHARMACEUTICAL: Performed by: FAMILY MEDICINE

## 2023-02-01 PROCEDURE — 71046 X-RAY EXAM CHEST 2 VIEWS: CPT

## 2023-02-01 PROCEDURE — A9539 TC99M PENTETATE: HCPCS | Performed by: FAMILY MEDICINE

## 2023-02-01 PROCEDURE — 78582 LUNG VENTILAT&PERFUS IMAGING: CPT

## 2023-02-01 RX ORDER — KIT FOR THE PREPARATION OF TECHNETIUM TC 99M PENTETATE 20 MG/1
40 INJECTION, POWDER, LYOPHILIZED, FOR SOLUTION INTRAVENOUS; RESPIRATORY (INHALATION)
Status: COMPLETED | OUTPATIENT
Start: 2023-02-01 | End: 2023-02-01

## 2023-02-01 RX ADMIN — KIT FOR THE PREPARATION OF TECHNETIUM TC 99M PENTETATE 40 MILLICURIE: 20 INJECTION, POWDER, LYOPHILIZED, FOR SOLUTION INTRAVENOUS; RESPIRATORY (INHALATION) at 09:30

## 2023-02-01 RX ADMIN — Medication 5.5 MILLICURIE: at 10:26

## 2023-02-03 DIAGNOSIS — J45.40 MODERATE PERSISTENT ASTHMA WITHOUT COMPLICATION: ICD-10-CM

## 2023-02-03 DIAGNOSIS — R06.02 SHORTNESS OF BREATH: ICD-10-CM

## 2023-02-03 RX ORDER — FLUTICASONE FUROATE, UMECLIDINIUM BROMIDE AND VILANTEROL TRIFENATATE 100; 62.5; 25 UG/1; UG/1; UG/1
POWDER RESPIRATORY (INHALATION)
Qty: 60 EACH | Refills: 0 | Status: SHIPPED | OUTPATIENT
Start: 2023-02-03

## 2023-02-08 ENCOUNTER — HOSPITAL ENCOUNTER (OUTPATIENT)
Dept: CT IMAGING | Age: 38
Discharge: HOME | End: 2023-02-08
Payer: COMMERCIAL

## 2023-02-08 ENCOUNTER — HOSPITAL ENCOUNTER (OUTPATIENT)
Dept: PULMONOLOGY | Age: 38
Discharge: HOME | End: 2023-02-08
Payer: COMMERCIAL

## 2023-02-08 DIAGNOSIS — J45.909 MILD ASTHMA WITHOUT COMPLICATION, UNSPECIFIED WHETHER PERSISTENT: Primary | ICD-10-CM

## 2023-02-08 DIAGNOSIS — R06.02 SHORTNESS OF BREATH: ICD-10-CM

## 2023-02-08 DIAGNOSIS — G47.33 OBSTRUCTIVE SLEEP APNEA: ICD-10-CM

## 2023-02-08 PROCEDURE — 71250 CT THORAX DX C-: CPT

## 2023-02-08 PROCEDURE — 94727 GAS DIL/WSHOT DETER LNG VOL: CPT

## 2023-02-08 PROCEDURE — 94060 EVALUATION OF WHEEZING: CPT

## 2023-02-08 PROCEDURE — 94729 DIFFUSING CAPACITY: CPT

## 2023-03-01 ENCOUNTER — TELEPHONE (OUTPATIENT)
Dept: INTERNAL MEDICINE CLINIC | Age: 38
End: 2023-03-01

## 2023-03-01 ENCOUNTER — INITIAL CONSULT (OUTPATIENT)
Dept: CARDIOLOGY CLINIC | Age: 38
End: 2023-03-01
Payer: COMMERCIAL

## 2023-03-01 VITALS
HEIGHT: 74 IN | HEART RATE: 100 BPM | BODY MASS INDEX: 36.06 KG/M2 | WEIGHT: 281 LBS | DIASTOLIC BLOOD PRESSURE: 60 MMHG | SYSTOLIC BLOOD PRESSURE: 100 MMHG

## 2023-03-01 DIAGNOSIS — I50.23 ACUTE ON CHRONIC SYSTOLIC CONGESTIVE HEART FAILURE (HCC): ICD-10-CM

## 2023-03-01 DIAGNOSIS — E03.9 ACQUIRED HYPOTHYROIDISM: ICD-10-CM

## 2023-03-01 DIAGNOSIS — G47.33 OBSTRUCTIVE SLEEP APNEA: ICD-10-CM

## 2023-03-01 DIAGNOSIS — I83.893 VARICOSE VEINS OF BOTH LEGS WITH EDEMA: Primary | ICD-10-CM

## 2023-03-01 DIAGNOSIS — I10 ESSENTIAL HYPERTENSION: ICD-10-CM

## 2023-03-01 PROCEDURE — 99204 OFFICE O/P NEW MOD 45 MIN: CPT | Performed by: INTERNAL MEDICINE

## 2023-03-01 PROCEDURE — 3078F DIAST BP <80 MM HG: CPT | Performed by: INTERNAL MEDICINE

## 2023-03-01 PROCEDURE — 3074F SYST BP LT 130 MM HG: CPT | Performed by: INTERNAL MEDICINE

## 2023-03-01 NOTE — PROGRESS NOTES
CARDIAC CONSULT NOTE       Maurice Case  30 y.o.  male    Chief Complaint   Patient presents with    Cardiomyopathy    Edema       Referring physician:  Erika Baron MD     Primary care physician:  Erika Baron MD    History of Present Illness:     Maurice Case is a 30 y.o. male referred for evaluation and management of CVI. Patient C/O Leg edema, prominent Leg veins, skin discoloration, night time cramps & aching Legs. Patient has been wearing compression stockings for about couple of months. Patient has had symptoms for nearly an year ago. has a past medical history of Anxiety, Asthma, Chronic back pain, Foot callus, Headache(784.0), History of nuclear stress test, History of stress test, Hx of echocardiogram, Hx of echocardiogram, Meningitis spinal, Phobia, Shortness of breath on exertion, and VL DUP LOWER EXTREMITY VENOUS BILATERAL. has a past surgical history that includes other surgical history (1997) and Knee arthroscopy ([id number]). reports that he quit smoking about a year ago. His smoking use included cigarettes. He started smoking about 23 years ago. He has a 66.00 pack-year smoking history. He quit smokeless tobacco use about 20 years ago. He reports current alcohol use. He reports current drug use. Drug: Marijuana Ari Machado). family history includes Cancer in his sister; Early Death (age of onset: 39) in his mother; Early Death (age of onset: 39) in his father; Heart Disease in his father; Hollice Chance / [place] in his sister; Other in his father and mother; Substance Abuse in his father, mother, and sister.     Review of Systems:   Cardiovascular: No chest pain, dyspnea on exertion, palpitations or loss of consciousness  Respiratory: No cough or wheezing    Musculoskeletal:  No gait disturbance, weakness, muscle cramps, aches & pains or joint complaints  Neurological: No TIA or stroke symptoms  Psychiatric: No anxiety or depression  Hematologic/Lymphatic: No bleeding problems, blood clots or swollen lymph nodes    Physical Examination:    /60   Pulse 100   Ht 6' 2\" (1.88 m)   Wt 281 lb (127.5 kg)   BMI 36.08 kg/m²    Wt Readings from Last 3 Encounters:   03/01/23 281 lb (127.5 kg)   01/25/23 280 lb (127 kg)   01/13/23 (!) 338 lb (153.3 kg)     Body mass index is 36.08 kg/m². General Appearance:  obese/Well Nourished  1. Skin: It is warm & dry. No rashes noted. 2. Eyes: No conjunctival Pallor seen. No jaundice noted. 3. Neck: is supple there is no elevation of JVD. No thyromegaly  4. Respiratory:  Resp Assessment: No abnormal findings. Resp Auscultation: Vesicular breath sounds without rales or wheezing. 5. Cardiovascular: Auscultation: Normal S1 & S2, No prominent murmurs  Carotid Arteries: No bruits present  Abdominal Aorta: Non-palpable  Pedal Pulses: 2+ and equal   6. Abdomen:  No masses or tenderness  Liver/Spleen: No Abnormalities Noted, no organomegaly. 7. Musculoskeletal: No joint deformities. No muscle wasting  8. Extremities:   No Cyanosis or Clubbing. There is significant edema with C4 changes. 9. Rectal / genital:   Deferred  10.  Neurological/Psychiatric:  Oriented to time, place, and person  Non-anxious    No results found for: CKTOTAL, CKMB, CKMBINDEX, TROPONINT  BNP:    Lab Results   Component Value Date/Time    PROBNP 4,725 12/15/2022 10:06 AM     PT/INR:  No results found for: PTINR  Lab Results   Component Value Date    LABA1C 6.6 12/15/2022    LABA1C 6.6 08/26/2022     Lab Results   Component Value Date    CHOL 160 07/26/2017    CHOL 151 05/19/2014    TRIG 369 (H) 07/26/2017    TRIG 152 (H) 05/19/2014    HDL 27 (L) 01/12/2023    HDL 26 (L) 07/26/2017    LDLCALC 55 01/12/2023    LDLDIRECT 100 (H) 05/19/2014     Lab Results   Component Value Date    ALT 14 01/13/2023    ALT 15 12/15/2022    AST 22 01/13/2023    AST 27 12/15/2022     BMP:    Lab Results   Component Value Date/Time     01/13/2023 10:00 AM     12/15/2022 10:06 AM    K 4.6 01/13/2023 10:00 AM    K 4.8 12/15/2022 10:06 AM     01/13/2023 10:00 AM    CL 98 12/15/2022 10:06 AM    CO2 31 01/13/2023 10:00 AM    CO2 29 12/15/2022 10:06 AM    BUN 19 01/13/2023 10:00 AM    BUN 18 12/15/2022 10:06 AM    CREATININE 0.7 01/13/2023 10:00 AM    CREATININE 0.9 12/15/2022 10:06 AM     CMP:    Lab Results   Component Value Date/Time     01/13/2023 10:00 AM     12/15/2022 10:06 AM    K 4.6 01/13/2023 10:00 AM    K 4.8 12/15/2022 10:06 AM     01/13/2023 10:00 AM    CL 98 12/15/2022 10:06 AM    CO2 31 01/13/2023 10:00 AM    CO2 29 12/15/2022 10:06 AM    BUN 19 01/13/2023 10:00 AM    BUN 18 12/15/2022 10:06 AM    CREATININE 0.7 01/13/2023 10:00 AM    CREATININE 0.9 12/15/2022 10:06 AM    PROT 7.8 01/13/2023 10:00 AM    PROT 7.1 12/15/2022 10:06 AM     TSH:    Lab Results   Component Value Date/Time    TSH 3.37 12/15/2022 10:06 AM    TSH 3.24 08/12/2022 11:45 AM    TSHHS 2.560 06/08/2022 01:12 PM    TSHHS 1.994 05/19/2014 07:21 AM       QUALITY MEASURES REVIEWED:  1.CAD:Patient is taking anti platelet agent:No  Patient does not have Hx of documented CAD  2. DYSLIPIDEMIA: Patient is on cholesterol lowering medication:Yes   3. Beta-Blocker therapy for CAD, if prior Myocardial Infarction:Yes  4. Counselled regarding smoking cessation. No   Patient does not Smoke. 5.Anticoagulation therapy (for A.Fib) No   Does Not have A.Fib.  6.Discussed weight management strategies. Assessment, Recommendations & Tests:     CVI: Patient has symptomatic C4 venous disease. 1/12/2023    No evidence of DVT or SVT in the bilateral common femoral vein, femoral    vein, popliteal vein, greater saphenous vein or small saphenous vein. Significant reflux noted of the Right CFV (1.4s), GSV Mid Thigh (1.2s), and    GSV Distal Calf (1.2s). Significant reflux noted in the Left Mid FV (1.0s), GSV Knee (0.6s), and GSV    Distal Calf (4.2s). Recommend Serial ablations of both GSVs.    I spent about 30 min. of time in review of the available data, chart Prep., interviewing patient, obtaining history, performing physical exam, going through decision making analysis for assessment & plans of management on this patient. Office Visit a month after the procedures.      Live Bran MD, 3/1/2023 11:50 AM

## 2023-03-01 NOTE — LETTER
March 1, 2023      Josiah Balderas MD  [facility] [id number]      Patient: Kiran Carson   MR Number: [id number]   YOB: 1985   Date of Visit: 3/1/2023       Dear Josiah Balderas: Thank you for referring [facility] to me for evaluation/treatment. Below are the relevant portions of my assessment and plan of care. If you have questions, please do not hesitate to call me. I look forward to following Girma Jeong along with you.     Sincerely,        Francia Biggs MD

## 2023-03-01 NOTE — PROGRESS NOTES
Vein \"LEG PROBLEM Questionnaire\"  Do you have prominent leg veins? Yes   Do you have any skin discoloration? Yes  Do you have any healed or active sores? No  Do you have swelling of the legs? Yes  Do you have a family history of varicose veins? 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 Yes  7. Have you been fighting overweight problems? Yes  8. Do you have restless legs? No  9. Do you have any night time cramps? Yes  10. Do you have any of the following in your legs:        Aching I  11. If Yes - Have they worn compression stockings Yes  12.  If they have worn compression stockings 2 Months

## 2023-03-01 NOTE — PATIENT INSTRUCTIONS
CVI: Patient has symptomatic C4 venous disease. 1/12/2023    No evidence of DVT or SVT in the bilateral common femoral vein, femoral    vein, popliteal vein, greater saphenous vein or small saphenous vein. Significant reflux noted of the Right CFV (1.4s), GSV Mid Thigh (1.2s), and    GSV Distal Calf (1.2s). Significant reflux noted in the Left Mid FV (1.0s), GSV Knee (0.6s), and GSV    Distal Calf (4.2s). Recommend Serial ablations of both GSVs.    I spent about 30 min. of time in review of the available data, chart Prep., interviewing patient, obtaining history, performing physical exam, going through decision making analysis for assessment & plans of management on this patient. Office Visit a month after the procedures.

## 2023-03-01 NOTE — TELEPHONE ENCOUNTER
PROVIDER FEEDBACK LOOP NO SHOW     Patient:Jonah Hays  : 1985  Referring Provider: Ej Young  Referral Type:  Eval and Treat     Procedures:  [id number] - AMB REFERRAL TO CARDIOLOGY  Date Service Ordered 12/15/2022        Bebo Hays did not show for their scheduled test ordered by your office. Please call your patient to explain significance of ordered test and direct patient to call Central Scheduling to schedule test at their earliest convenience. Please complete one of the following actions from Quick Actions buttons:     Route to Provider:  Route message to ordering provider to seek next steps in care plan. Telephone Encounter:  Telephone encounter will open. Call patient to explain significance of ordered test and direct patient to call Central Scheduling to schedule test then Document details of call. Open Referral:  Review referral notes or details if needed. Close Referral:  Referral will open. Document in Notes section of referral why the referral is being closed. Examples of referral closure:  Patient had test done outside of [facility] ([facility]) (list location), Patient refuses test, Patient no longer having symptoms, Unable to reach patient. Only close the referral if you are sure the test will not proceed.      Thank you     Central Scheduling

## 2023-03-08 ENCOUNTER — OFFICE VISIT (OUTPATIENT)
Dept: PULMONOLOGY | Age: 38
End: 2023-03-08
Payer: COMMERCIAL

## 2023-03-08 VITALS
HEIGHT: 74 IN | WEIGHT: 280 LBS | SYSTOLIC BLOOD PRESSURE: 136 MMHG | HEART RATE: 92 BPM | OXYGEN SATURATION: 96 % | BODY MASS INDEX: 35.94 KG/M2 | DIASTOLIC BLOOD PRESSURE: 80 MMHG

## 2023-03-08 DIAGNOSIS — J45.40 MODERATE PERSISTENT ASTHMA WITHOUT COMPLICATION: ICD-10-CM

## 2023-03-08 DIAGNOSIS — G47.33 OBSTRUCTIVE SLEEP APNEA: Primary | ICD-10-CM

## 2023-03-08 DIAGNOSIS — R06.02 SHORTNESS OF BREATH: ICD-10-CM

## 2023-03-08 PROBLEM — E11.9 TYPE 2 DIABETES MELLITUS (HCC): Status: ACTIVE | Noted: 2023-03-08

## 2023-03-08 PROCEDURE — 99214 OFFICE O/P EST MOD 30 MIN: CPT | Performed by: NURSE PRACTITIONER

## 2023-03-08 PROCEDURE — 3075F SYST BP GE 130 - 139MM HG: CPT | Performed by: NURSE PRACTITIONER

## 2023-03-08 PROCEDURE — 3079F DIAST BP 80-89 MM HG: CPT | Performed by: NURSE PRACTITIONER

## 2023-03-08 RX ORDER — ALBUTEROL SULFATE 90 UG/1
2 AEROSOL, METERED RESPIRATORY (INHALATION) EVERY 4 HOURS PRN
Qty: 2 EACH | Refills: 3 | Status: SHIPPED | OUTPATIENT
Start: 2023-03-08

## 2023-03-08 RX ORDER — FLUTICASONE FUROATE, UMECLIDINIUM BROMIDE AND VILANTEROL TRIFENATATE 100; 62.5; 25 UG/1; UG/1; UG/1
POWDER RESPIRATORY (INHALATION)
Qty: 60 EACH | Refills: 5 | Status: SHIPPED | OUTPATIENT
Start: 2023-03-08

## 2023-03-08 ASSESSMENT — ENCOUNTER SYMPTOMS
RESPIRATORY NEGATIVE: 1
EYES NEGATIVE: 1
GASTROINTESTINAL NEGATIVE: 1
ALLERGIC/IMMUNOLOGIC NEGATIVE: 1

## 2023-03-08 NOTE — PROGRESS NOTES
Jonah Hays (:  1985) is a 37 y.o. male,Established patient, here for evaluation of the following chief complaint(s):  Follow-up (Results )        Subjective   SUBJECTIVE/OBJECTIVE:  Jonah Sorenson 36 yo male presents to pulmonary clinic for follow up on shortness of breath and sleep apnea. Jonah reports that Trelegy is working well for him. Since starting Trelegy he has used his Albuterol inhaler less. He is celebrating being a nonsmoker since two weeks now. He endorses being able to breath easier, coughing up sputum and able to clear airway passages. Jonah states that he is eating better and increasing his  activiy to lose weight. He completed a home sleep study for snoring that has been witnessed by his spouse.    AHI 12.0 event per hour  Lowest SatO2 85 %  HR 76 bpm    Findings are consistent with mild non positional obstructive sleep apnea  Based on results Jonah qualifies for CPAP       Review of Systems   Constitutional: Negative.    HENT: Negative.     Eyes: Negative.    Respiratory: Negative.     Cardiovascular: Negative.    Gastrointestinal: Negative.    Endocrine: Negative.    Musculoskeletal: Negative.    Skin: Negative.    Allergic/Immunologic: Negative.    Neurological: Negative.    Hematological: Negative.    Psychiatric/Behavioral:  Positive for sleep disturbance.         Snoring         Objective   Physical Exam  Vitals and nursing note reviewed.   Constitutional:       General: He is awake.      Appearance: Normal appearance. He is well-developed and well-groomed. He is obese.   Neurological:      Mental Status: He is alert.   Psychiatric:         Behavior: Behavior is cooperative.             ASSESSMENT/PLAN:  1. Obstructive sleep apnea  -     DME Order for CPAP as OP; Future  2. Shortness of breath  -     fluticasone-umeclidin-vilant (TRELEGY ELLIPTA) 100-62.5-25 MCG/ACT AEPB inhaler; INHALE 1 PUFF INTO THE LUNGS DAILY. RINSE MOUTH AFTER USE., Disp-60 each, R-5Normal  -     albuterol sulfate  HFA (VENTOLIN HFA) 108 (90 Base) MCG/ACT inhaler; Inhale 2 puffs into the lungs every 4 hours as needed for Wheezing or Shortness of Breath, Disp-2 each, R-3Normal  3. Moderate persistent asthma without complication  -     fluticasone-umeclidin-vilant (TRELEGY ELLIPTA) 100-62.5-25 MCG/ACT AEPB inhaler; INHALE 1 PUFF INTO THE LUNGS DAILY. RINSE MOUTH AFTER USE., Disp-60 each, R-5Normal  -     albuterol sulfate HFA (VENTOLIN HFA) 108 (90 Base) MCG/ACT inhaler; Inhale 2 puffs into the lungs every 4 hours as needed for Wheezing or Shortness of Breath, Disp-2 each, R-3Normal  4. Class 3 severe obesity due to excess calories without serious comorbidity with body mass index (BMI) of 45.0 to 49.9 in adult [facility])    --Recommend AutoPap   --Continue using Trelegy 200 mcg E-script sent to patient's pharmacy   --Return in 30 to 90 days for CPAP compliance report     Vital Signs  /80   Pulse 92   Ht 6' 2\" (1.88 m)   Wt 280 lb (127 kg)   SpO2 96%   BMI 35.95 kg/m²      No follow-ups on file. Sleep Medicine 1/9/2023 5/22/2021 5/22/2021   Sitting and reading 2 0 -   Watching TV 2 0 -   Sitting, inactive in a public place (e.g. a theatre or a meeting) 1 0 -   As a passenger in a car for an hour without a break 0 0 -   Lying down to rest in the afternoon when circumstances permit 3 3 -   Sitting and talking to someone 1 0 -   Sitting quietly after a lunch without alcohol 1 1 -   In a car, while stopped for a few minutes in traffic 0 0 -   [place] Sleepiness Score 10 4 -   Neck circumference (Inches) 18 20.5 20.5       Current Outpatient Medications   Medication Sig Dispense Refill    fluticasone-umeclidin-vilant (TRELEGY ELLIPTA) 100-62.5-25 MCG/ACT AEPB inhaler INHALE 1 PUFF INTO THE LUNGS DAILY. RINSE MOUTH AFTER USE.  60 each 5    albuterol sulfate HFA (VENTOLIN HFA) 108 (90 Base) MCG/ACT inhaler Inhale 2 puffs into the lungs every 4 hours as needed for Wheezing or Shortness of Breath 2 each 3 sacubitril-valsartan (ENTRESTO) 49-51 MG per tablet Take 1 tablet by mouth 2 times daily 60 tablet 3    atorvastatin (LIPITOR) 20 MG tablet Take 1 tablet by mouth daily 90 tablet 1    metFORMIN (GLUCOPHAGE-XR) 500 MG extended release tablet TAKE 1 TABLET BY MOUTH TWO TIMES A DAY WITH MEALS 60 tablet 3    furosemide (LASIX) 40 MG tablet Take 1.5 tablets by mouth daily 30 tablet 1    Ascorbic Acid (VITAMIN C) 250 MG tablet Take 250 mg by mouth daily      Cetirizine HCl (ZYRTEC PO) Take by mouth      metoprolol succinate (TOPROL XL) 25 MG extended release tablet Take 1 tablet by mouth daily 30 tablet 3    spironolactone (ALDACTONE) 25 MG tablet Take 1 tablet by mouth daily 90 tablet 1    empagliflozin (JARDIANCE) 10 MG tablet Take 1 tablet by mouth daily 90 tablet 1    Alcohol Swabs PADS USE AS DIRECTED TO CLEAN SKIN PRIOR TO CHECKING BLOOD GLUCOSE 100 each 0    Prodigy Twist Top Lancets 28G MISC TEST BLOOD GLUCOSE ONCE A  each 1    sertraline (ZOLOFT) 100 MG tablet Take 1 tablet by mouth daily 30 tablet 3    blood glucose test strips (PRODIGY NO CODING BLOOD GLUC) strip TEST BLOOD GLUCOSE ONCE A DAY & AS NEEDED FOR SYMPTOMS OF IRREGULAR BLOOD GLUCOSE. 100 strip 1    Blood Pressure KIT Check blood pressure daily 1 kit 0    glucose monitoring (FREESTYLE FREEDOM) kit 1 kit by Does not apply route daily 1 kit 0     No current facility-administered medications for this visit.       Active Ambulatory Problems     Diagnosis Date Noted    Knee pain 05/23/2012    Lateral meniscal tear 06/06/2012    Low back pain 11/15/2012    Acquired hypothyroidism 08/09/2017    Essential hypertension 08/14/2017    Anxiety 05/12/2022    Class 3 severe obesity due to excess calories without serious comorbidity in adult [facility]) 05/12/2022    Obstructive sleep apnea 05/12/2022    Shortness of breath 01/09/2023    Asthma 01/09/2023    Acute on chronic systolic congestive heart failure ([facility] [address] 01/13/2023    Varicose veins of both legs with edema 2023    Type 2 diabetes mellitus 2023     Resolved Ambulatory Problems     Diagnosis Date Noted    Tobacco dependence 11/15/2012    Sodium blood increased 2014     Past Medical History:   Diagnosis Date    Chronic back pain     Foot callus     Headache(784.0)     History of nuclear stress test 2023    History of stress test 2023    Hx of echocardiogram 2023    Hx of echocardiogram 2023    Meningitis spinal  or     Phobia     Shortness of breath on exertion     VL DUP LOWER EXTREMITY VENOUS BILATERAL 2023        Tobacco Use      Smoking status: Former        Packs/day: 3.00        Years: 22.00        Pack years: 66        Types: Cigarettes        Start date:         Quit date: 2022        Years since quittin.0      Smokeless tobacco: Former      Tobacco comments: \"Occ chewing tobacco when quit in \" chews nicotine gum         Jonah expressed understanding of the information discussed. He is in agreement with the treatment plan of care.     An electronic signature was used to authenticate this note.    --Negin Hill, LIZ - CNP

## 2023-03-10 DIAGNOSIS — J45.40 MODERATE PERSISTENT ASTHMA WITHOUT COMPLICATION: ICD-10-CM

## 2023-03-10 DIAGNOSIS — R06.02 SHORTNESS OF BREATH: ICD-10-CM

## 2023-03-10 RX ORDER — FLUTICASONE FUROATE, UMECLIDINIUM BROMIDE AND VILANTEROL TRIFENATATE 100; 62.5; 25 UG/1; UG/1; UG/1
POWDER RESPIRATORY (INHALATION)
Qty: 60 EACH | Refills: 0 | OUTPATIENT
Start: 2023-03-10

## 2023-03-12 DIAGNOSIS — F41.9 ANXIETY: ICD-10-CM

## 2023-03-13 RX ORDER — BLOOD PRESSURE TEST KIT
KIT MISCELLANEOUS
Qty: 100 EACH | Refills: 0 | Status: SHIPPED | OUTPATIENT
Start: 2023-03-13

## 2023-03-13 RX ORDER — SERTRALINE HYDROCHLORIDE 100 MG/1
100 TABLET, FILM COATED ORAL DAILY
Qty: 30 TABLET | Refills: 2 | Status: SHIPPED | OUTPATIENT
Start: 2023-03-13

## 2023-03-21 DIAGNOSIS — E11.9 TYPE 2 DIABETES MELLITUS WITHOUT COMPLICATION, WITHOUT LONG-TERM CURRENT USE OF INSULIN (HCC): ICD-10-CM

## 2023-03-21 RX ORDER — METFORMIN HYDROCHLORIDE 500 MG/1
TABLET, EXTENDED RELEASE ORAL
Qty: 60 TABLET | Refills: 3 | Status: SHIPPED | OUTPATIENT
Start: 2023-03-21

## 2023-03-24 ENCOUNTER — TELEPHONE (OUTPATIENT)
Dept: CARDIOLOGY CLINIC | Age: 38
End: 2023-03-24

## 2023-04-17 ENCOUNTER — PROCEDURE VISIT (OUTPATIENT)
Dept: CARDIOLOGY CLINIC | Age: 38
End: 2023-04-17
Payer: COMMERCIAL

## 2023-04-17 DIAGNOSIS — I10 ESSENTIAL HYPERTENSION: ICD-10-CM

## 2023-04-17 DIAGNOSIS — E11.9 TYPE 2 DIABETES MELLITUS WITHOUT COMPLICATION, WITHOUT LONG-TERM CURRENT USE OF INSULIN (HCC): ICD-10-CM

## 2023-04-17 DIAGNOSIS — G47.33 OBSTRUCTIVE SLEEP APNEA: ICD-10-CM

## 2023-04-17 DIAGNOSIS — I83.893 VARICOSE VEINS OF BOTH LEGS WITH EDEMA: Primary | ICD-10-CM

## 2023-04-17 PROCEDURE — 36465 NJX NONCMPND SCLRSNT 1 VEIN: CPT | Performed by: INTERNAL MEDICINE

## 2023-04-17 NOTE — PROGRESS NOTES
VENOUS PRE-PROCEDURE H & P  4/17/2023    Subjective:  Rickey Mehta is a 30 y.o. male    GENERAL - A-Ox3- In no respiratory distress  Heart - Regular rhythm, normal S1, S2, no gallops, no murmurs, no friction rubs  Chest - CTA & percussion    Vein Exam:     Right ext - varicosities, spider and reticular veins Yes, skin changes Yes, ulcers No, edema Yes    Left ext  - varicosities, spider and reticular veins Yes, skin changes Yes, ulcers No, edema Yes    Reflex Study:    No evidence of DVT or SVT in the bilateral common femoral vein, femoral    vein, popliteal vein, greater saphenous vein or small saphenous vein. Significant reflux noted of the Right CFV (1.4s), GSV Mid Thigh (1.2s), and    GSV Distal Calf (1.2s). Significant reflux noted in the Left Mid FV (1.0s), GSV Knee (0.6s), and GSV    Distal Calf (4.2s). Assessment:   Patient has symptomatic C4 venous disease. Plan:   Ablation of right GSV. Informed consent obtained.     Wilner Gomez MD, [facility]
System of the  Right side.       Kam Zuñiga MD, [facility]

## 2023-04-19 ENCOUNTER — PROCEDURE VISIT (OUTPATIENT)
Dept: CARDIOLOGY CLINIC | Age: 38
End: 2023-04-19
Payer: COMMERCIAL

## 2023-04-19 DIAGNOSIS — R06.09 DOE (DYSPNEA ON EXERTION): ICD-10-CM

## 2023-04-19 LAB
LV EF: 38 %
LVEF MODALITY: NORMAL

## 2023-04-19 PROCEDURE — 93306 TTE W/DOPPLER COMPLETE: CPT | Performed by: INTERNAL MEDICINE

## 2023-04-20 ENCOUNTER — TELEPHONE (OUTPATIENT)
Dept: CARDIOLOGY CLINIC | Age: 38
End: 2023-04-20

## 2023-04-26 ENCOUNTER — OFFICE VISIT (OUTPATIENT)
Dept: CARDIOLOGY CLINIC | Age: 38
End: 2023-04-26
Payer: COMMERCIAL

## 2023-04-26 VITALS
DIASTOLIC BLOOD PRESSURE: 72 MMHG | HEART RATE: 80 BPM | SYSTOLIC BLOOD PRESSURE: 110 MMHG | BODY MASS INDEX: 36.45 KG/M2 | HEIGHT: 74 IN | WEIGHT: 284 LBS

## 2023-04-26 DIAGNOSIS — R06.02 SHORTNESS OF BREATH: Primary | ICD-10-CM

## 2023-04-26 DIAGNOSIS — I10 ESSENTIAL HYPERTENSION: ICD-10-CM

## 2023-04-26 DIAGNOSIS — E78.5 DYSLIPIDEMIA: ICD-10-CM

## 2023-04-26 DIAGNOSIS — I42.8 NON-ISCHEMIC CARDIOMYOPATHY (HCC): ICD-10-CM

## 2023-04-26 DIAGNOSIS — I50.22 CHRONIC SYSTOLIC HEART FAILURE (HCC): ICD-10-CM

## 2023-04-26 DIAGNOSIS — R60.0 EDEMA OF LOWER EXTREMITY: ICD-10-CM

## 2023-04-26 PROCEDURE — 3078F DIAST BP <80 MM HG: CPT | Performed by: INTERNAL MEDICINE

## 2023-04-26 PROCEDURE — 3074F SYST BP LT 130 MM HG: CPT | Performed by: INTERNAL MEDICINE

## 2023-04-26 PROCEDURE — 99214 OFFICE O/P EST MOD 30 MIN: CPT | Performed by: INTERNAL MEDICINE

## 2023-04-26 NOTE — PROGRESS NOTES
Pema Jarvis MD                                  CARDIOLOGY  NOTE           Chief Complaint:    Chief Complaint   Patient presents with    3 Month Follow-Up     Pt denies any new cardiac sx having another vein procedure  done with Dr. Francois Dates and no refills needed at this time      Doing better  Voiding well  Lost 90+ pounds  Breathing improved       LHC/RHC 2023      Severe non ischemic CMP with dilated RV and high LVEDP, PA, RA   pressures   Echo suggests RV dilation / Biventricular Failure      Recommendations   Obtain VQ scan as outpt   Will consider IR evaluation for thoracocentesis   Cont GDMT   CHF follow up      HPI:     Dorina Schneider is a 30y.o. year old male who presents to the clinic with chief complaint of shortness of breath lower extremity edema    Patient is 6 feet 2 inches and 386 pounds. Mentions that over the past several months he has been diagnosed with diabetes mellitus, has been diagnosed with lower extremity edema and is on Lasix. Patient mentions that he has been experiencing shortness of breath with little exertion as well as lower extremity edema. Denies any PND orthopnea. Patient has been previously diagnosed with sleep apnea however is noncompliant with sleep apnea  Patient is a former smoker who quit 9 months ago. Smokes marijuana 2-3 times per week. Denies any alcohol abuse. Family history is positive for premature CAD in his father who  at the age of 39 from heart attack, also was a drug abuser. Patient first cousin had congenital heart disease which was operated upon.     EKG in the office today shows sinus rhythm, sinus tachycardia    Current Outpatient Medications   Medication Sig Dispense Refill    metFORMIN (GLUCOPHAGE-XR) 500 MG extended release tablet TAKE 1 TABLET BY MOUTH TWO TIMES A DAY WITH MEALS 60 tablet 2    sertraline (ZOLOFT) 100 MG tablet Take 1 tablet by mouth daily 30 tablet 2    fluticasone-umeclidin-vilant (TRELEGY ELLIPTA) 100-62.5-25 MCG/ACT AEPB

## 2023-05-10 ENCOUNTER — OFFICE VISIT (OUTPATIENT)
Dept: PULMONOLOGY | Age: 38
End: 2023-05-10
Payer: COMMERCIAL

## 2023-05-10 VITALS
WEIGHT: 284 LBS | OXYGEN SATURATION: 98 % | BODY MASS INDEX: 36.45 KG/M2 | HEIGHT: 74 IN | HEART RATE: 77 BPM | RESPIRATION RATE: 16 BRPM

## 2023-05-10 DIAGNOSIS — J45.40 MODERATE PERSISTENT ASTHMA WITHOUT COMPLICATION: ICD-10-CM

## 2023-05-10 DIAGNOSIS — G47.33 OBSTRUCTIVE SLEEP APNEA: Primary | ICD-10-CM

## 2023-05-10 PROCEDURE — 99214 OFFICE O/P EST MOD 30 MIN: CPT | Performed by: NURSE PRACTITIONER

## 2023-05-10 RX ORDER — ALBUTEROL SULFATE 90 UG/1
2 AEROSOL, METERED RESPIRATORY (INHALATION) EVERY 4 HOURS PRN
Qty: 2 EACH | Refills: 2 | Status: SHIPPED | OUTPATIENT
Start: 2023-05-10

## 2023-05-10 RX ORDER — FLUTICASONE FUROATE, UMECLIDINIUM BROMIDE AND VILANTEROL TRIFENATATE 100; 62.5; 25 UG/1; UG/1; UG/1
POWDER RESPIRATORY (INHALATION)
Qty: 60 EACH | Refills: 2 | Status: SHIPPED | OUTPATIENT
Start: 2023-05-10

## 2023-05-10 ASSESSMENT — ENCOUNTER SYMPTOMS
EYES NEGATIVE: 1
ALLERGIC/IMMUNOLOGIC NEGATIVE: 1
RESPIRATORY NEGATIVE: 1

## 2023-05-10 NOTE — PROGRESS NOTES
Chronic back pain     Foot callus     Headache(784.0)     History of nuclear stress test 01/09/2023    History of stress test 01/09/2023    Hx of echocardiogram 01/09/2023    Hx of echocardiogram 01/09/2023    Meningitis spinal 1987 or 1988    Phobia     Shortness of breath on exertion     VL DUP LOWER EXTREMITY VENOUS BILATERAL 01/12/2023     Current Outpatient Medications on File Prior to Visit   Medication Sig Dispense Refill    metFORMIN (GLUCOPHAGE-XR) 500 MG extended release tablet TAKE 1 TABLET BY MOUTH TWO TIMES A DAY WITH MEALS 60 tablet 2    sertraline (ZOLOFT) 100 MG tablet Take 1 tablet by mouth daily 30 tablet 2    Alcohol Swabs PADS USE AS DIRECTED TO CLEAN SKIN PRIOR TO CHECKING BLOOD SUGAR 100 each 0    sacubitril-valsartan (ENTRESTO) 49-51 MG per tablet Take 1 tablet by mouth 2 times daily 60 tablet 3    atorvastatin (LIPITOR) 20 MG tablet Take 1 tablet by mouth daily 90 tablet 1    furosemide (LASIX) 40 MG tablet Take 1.5 tablets by mouth daily 30 tablet 1    Ascorbic Acid (VITAMIN C) 250 MG tablet Take 1 tablet by mouth daily      Cetirizine HCl (ZYRTEC PO) Take by mouth      metoprolol succinate (TOPROL XL) 25 MG extended release tablet Take 1 tablet by mouth daily 30 tablet 3    spironolactone (ALDACTONE) 25 MG tablet Take 1 tablet by mouth daily 90 tablet 1    empagliflozin (JARDIANCE) 10 MG tablet Take 1 tablet by mouth daily 90 tablet 1    Prodigy Twist Top Lancets 28G MISC TEST BLOOD GLUCOSE ONCE A  each 1    blood glucose test strips (PRODIGY NO CODING BLOOD GLUC) strip TEST BLOOD GLUCOSE ONCE A DAY & AS NEEDED FOR SYMPTOMS OF IRREGULAR BLOOD GLUCOSE. 100 strip 1    Blood Pressure KIT Check blood pressure daily 1 kit 0    glucose monitoring (FREESTYLE FREEDOM) kit 1 kit by Does not apply route daily 1 kit 0     No current facility-administered medications on file prior to visit.        Tobacco Use      Smoking status: Former        Packs/day: 3.00        Years: 22.00        Pack

## 2023-05-12 ENCOUNTER — PROCEDURE VISIT (OUTPATIENT)
Dept: CARDIOLOGY CLINIC | Age: 38
End: 2023-05-12

## 2023-05-12 DIAGNOSIS — I87.301 IDIOPATHIC CHRONIC VENOUS HYPERTENSION OF RIGHT LOWER EXTREMITY WITHOUT COMPLICATIONS: Primary | ICD-10-CM

## 2023-05-15 ENCOUNTER — TELEPHONE (OUTPATIENT)
Dept: CARDIOLOGY CLINIC | Age: 38
End: 2023-05-15

## 2023-05-15 RX ORDER — METOPROLOL SUCCINATE 25 MG/1
25 TABLET, EXTENDED RELEASE ORAL DAILY
Qty: 30 TABLET | Refills: 5 | Status: SHIPPED | OUTPATIENT
Start: 2023-05-15

## 2023-05-15 NOTE — TELEPHONE ENCOUNTER
Venous doppler:5/12/2023  Right CFV and PTV are patent with good compressibility and respirophasic   signal with good augmentation. The Right GSV and its tributaries of the calf are non-compressible with no   evidence of flow just past the saphenofemoral junction to the distal calf. Still needs LGSV.  Going to have done on 6/12/23    Patient verbally understood

## 2023-05-31 ENCOUNTER — PROCEDURE VISIT (OUTPATIENT)
Dept: CARDIOLOGY CLINIC | Age: 38
End: 2023-05-31

## 2023-05-31 DIAGNOSIS — I87.301 IDIOPATHIC CHRONIC VENOUS HYPERTENSION OF RIGHT LOWER EXTREMITY WITHOUT COMPLICATIONS: Primary | ICD-10-CM

## 2023-06-01 ENCOUNTER — TELEPHONE (OUTPATIENT)
Dept: CARDIOLOGY CLINIC | Age: 38
End: 2023-06-01

## 2023-06-01 NOTE — TELEPHONE ENCOUNTER
Venous doppler:5/31/2023  Right CFV and PTV are patent with good compressibility and respirophasic   signal with good augmentation. The Right GSV is non-compressible with no evidence of flow just past the   saphenofemoral junction to the distal calf, as well as its tributaries of   the right calf.     Patient verbally understood

## 2023-06-06 DIAGNOSIS — E11.9 TYPE 2 DIABETES MELLITUS WITHOUT COMPLICATION, WITHOUT LONG-TERM CURRENT USE OF INSULIN (HCC): ICD-10-CM

## 2023-06-06 DIAGNOSIS — F41.9 ANXIETY: ICD-10-CM

## 2023-06-06 RX ORDER — SERTRALINE HYDROCHLORIDE 100 MG/1
TABLET, FILM COATED ORAL
Qty: 30 TABLET | Refills: 2 | Status: SHIPPED | OUTPATIENT
Start: 2023-06-06

## 2023-06-06 RX ORDER — BLOOD PRESSURE TEST KIT
KIT MISCELLANEOUS
Qty: 100 EACH | Refills: 0 | Status: SHIPPED | OUTPATIENT
Start: 2023-06-06

## 2023-06-06 RX ORDER — LANCETS 28 GAUGE
EACH MISCELLANEOUS
Qty: 100 EACH | Refills: 1 | Status: SHIPPED | OUTPATIENT
Start: 2023-06-06

## 2023-06-06 RX ORDER — BLOOD SUGAR DIAGNOSTIC
STRIP MISCELLANEOUS
Qty: 100 STRIP | Refills: 1 | Status: SHIPPED | OUTPATIENT
Start: 2023-06-06

## 2023-06-07 ENCOUNTER — TELEPHONE (OUTPATIENT)
Dept: CARDIOLOGY CLINIC | Age: 38
End: 2023-06-07

## 2023-06-07 NOTE — TELEPHONE ENCOUNTER
Pre- Instructions Venous ablation     Date of Procedure: 6/12/23 Time: 9 AM Arrival Time: 845 AM      Please keep yourself hydrated for 24 hours before the procedure ( drink lots of water)  Please wear loose comfortable clothing. Patient does not need to wear compression stockings to the procedure. You will need someone with you to drive you home. Please eat a light breakfast in the morning  Please continue to take medications as needed. Confirmed with patient. Reviewed instructions and patient voiced understanding. Patient declines valium.

## 2023-07-11 DIAGNOSIS — R60.9 SWELLING: ICD-10-CM

## 2023-07-11 DIAGNOSIS — R06.02 SHORTNESS OF BREATH: ICD-10-CM

## 2023-07-11 RX ORDER — FUROSEMIDE 40 MG/1
60 TABLET ORAL DAILY
Qty: 135 TABLET | Refills: 1 | Status: SHIPPED | OUTPATIENT
Start: 2023-07-11

## 2023-07-13 RX ORDER — SPIRONOLACTONE 25 MG/1
25 TABLET ORAL DAILY
Qty: 90 TABLET | Refills: 1 | Status: SHIPPED | OUTPATIENT
Start: 2023-07-13

## 2023-07-20 ENCOUNTER — TELEPHONE (OUTPATIENT)
Dept: CARDIOLOGY CLINIC | Age: 38
End: 2023-07-20

## 2023-07-20 DIAGNOSIS — E11.9 TYPE 2 DIABETES MELLITUS WITHOUT COMPLICATION, WITHOUT LONG-TERM CURRENT USE OF INSULIN (HCC): ICD-10-CM

## 2023-07-20 RX ORDER — METFORMIN HYDROCHLORIDE 500 MG/1
TABLET, EXTENDED RELEASE ORAL
Qty: 60 TABLET | Refills: 3 | Status: SHIPPED | OUTPATIENT
Start: 2023-07-20

## 2023-07-20 NOTE — TELEPHONE ENCOUNTER
Pre- Instructions Venous ablation     Date of Procedure: 7/24/23 Time: 10:00am Arrival Time: 9:45am      Please keep yourself hydrated for 24 hours before the procedure ( drink lots of water)  Please  the Valium that was sent to do not need  and bring it with you to the procedure. Please wear loose comfortable clothing. Patient does not need to wear compression stockings to the procedure. You will need someone with you to drive you home. Please eat a light breakfast in the morning  Please continue to take medications as needed. xConfirmed with patient. Reviewed instructions and patient voiced understanding. Patient declines valium.

## 2023-07-24 ENCOUNTER — PROCEDURE VISIT (OUTPATIENT)
Dept: CARDIOLOGY CLINIC | Age: 38
End: 2023-07-24
Payer: COMMERCIAL

## 2023-07-24 DIAGNOSIS — I10 ESSENTIAL HYPERTENSION: ICD-10-CM

## 2023-07-24 DIAGNOSIS — E66.01 CLASS 3 SEVERE OBESITY DUE TO EXCESS CALORIES WITHOUT SERIOUS COMORBIDITY WITH BODY MASS INDEX (BMI) OF 45.0 TO 49.9 IN ADULT (HCC): ICD-10-CM

## 2023-07-24 DIAGNOSIS — I83.893 VARICOSE VEINS OF BOTH LEGS WITH EDEMA: Primary | ICD-10-CM

## 2023-07-24 DIAGNOSIS — E11.9 TYPE 2 DIABETES MELLITUS WITHOUT COMPLICATION, WITHOUT LONG-TERM CURRENT USE OF INSULIN (HCC): ICD-10-CM

## 2023-07-24 DIAGNOSIS — G47.33 OBSTRUCTIVE SLEEP APNEA: ICD-10-CM

## 2023-07-24 PROCEDURE — 36465 NJX NONCMPND SCLRSNT 1 VEIN: CPT | Performed by: INTERNAL MEDICINE

## 2023-07-24 NOTE — PROGRESS NOTES
VENOUS PRE-PROCEDURE H & P  7/24/2023    Subjective:  Gamaliel Sullivan is a 30 y.o. male    GENERAL - A-Ox3- In no respiratory distress  Heart - Regular rhythm, normal S1, S2, no gallops, no murmurs, no friction rubs  Chest - CTA & percussion    Vein Exam:     Right ext - varicosities, spider and reticular veins Yes, skin changes Yes, ulcers No, edema Yes    Left ext  - varicosities, spider and reticular veins Yes, skin changes Yes, ulcers No, edema Yes    Reflex Study:    No evidence of DVT or SVT in the bilateral common femoral vein, femoral    vein, popliteal vein, greater saphenous vein or small saphenous vein. Significant reflux noted of the Right CFV (1.4s), GSV Mid Thigh (1.2s), and    GSV Distal Calf (1.2s). Significant reflux noted in the Left Mid FV (1.0s), GSV Knee (0.6s), and GSV    Distal Calf (4.2s). Assessment:   Patient has symptomatic C4 venous disease. Plan:   Ablation  of Left GSV  Informed consent obtained.     Ning Smith MD, [facility] [place]
stopped. The vein was observed for spasm under ultrasound, once vein was in full spasm the administration of Varithena was stopped. After the administration of Yadira Min the patient was asked to dorsifelx the ankle to limit flow of foam into perforating veins. Once appropriate spasm had been confirmed in the treated veins, the introducer sheath was pulled out from the access site. Hemostasis was achieved with manual compression and an adhesive bandage was applied to the incision. Ultrasound confirmed incomplete coaptation and closure of the treated segments of the GSV, and the absence of any DVT at the saphenofemoral junction. Treatment dose was approximately 7 ml and the vein length treated was 45 cm. Once satisfactory results were obtained, procedure concluded. The drapes were removed and the patient cleaned and prepared for discharge. Post op ultrasound check is scheduled for   48- 72 hours and the patient was given written post-op instructions. Complications: none immefdiate    Blood Loss: minimal    Conclusion:   Successful Endovenous Ablation of the Great Saphenous Vein with Varithena chemical ablation System of the  Left side.       Delia Dunlap MD, [facility] [place]

## 2023-08-02 ENCOUNTER — PROCEDURE VISIT (OUTPATIENT)
Dept: CARDIOLOGY CLINIC | Age: 38
End: 2023-08-02
Payer: COMMERCIAL

## 2023-08-02 DIAGNOSIS — I87.302 IDIOPATHIC CHRONIC VENOUS HYPERTENSION OF LEFT LOWER EXTREMITY WITHOUT COMPLICATIONS: Primary | ICD-10-CM

## 2023-08-02 PROCEDURE — 93971 EXTREMITY STUDY: CPT | Performed by: INTERNAL MEDICINE

## 2023-08-07 ENCOUNTER — TELEPHONE (OUTPATIENT)
Dept: CARDIOLOGY CLINIC | Age: 38
End: 2023-08-07

## 2023-08-07 DIAGNOSIS — J45.40 MODERATE PERSISTENT ASTHMA WITHOUT COMPLICATION: ICD-10-CM

## 2023-08-07 RX ORDER — ALBUTEROL SULFATE 90 UG/1
2 AEROSOL, METERED RESPIRATORY (INHALATION) EVERY 4 HOURS PRN
Qty: 2 EACH | Refills: 2 | Status: SHIPPED | OUTPATIENT
Start: 2023-08-07

## 2023-08-07 NOTE — TELEPHONE ENCOUNTER
Called patient and provided the following:    Left Impression  Left CFV is patent with good compressibility and respirophasic signal with  good augmentation. The Left GSV is non-compressible with no evidence of flow from the knee to the  distal calf.     Verified next appointment date:  08/30/2023 at 10:00 AM

## 2023-08-15 RX ORDER — ATORVASTATIN CALCIUM 20 MG/1
20 TABLET, FILM COATED ORAL DAILY
Qty: 90 TABLET | Refills: 3 | Status: SHIPPED | OUTPATIENT
Start: 2023-08-15

## 2023-08-23 ENCOUNTER — PROCEDURE VISIT (OUTPATIENT)
Dept: CARDIOLOGY CLINIC | Age: 38
End: 2023-08-23
Payer: COMMERCIAL

## 2023-08-23 DIAGNOSIS — I87.302 IDIOPATHIC CHRONIC VENOUS HYPERTENSION OF LEFT LOWER EXTREMITY WITHOUT COMPLICATIONS: ICD-10-CM

## 2023-08-23 DIAGNOSIS — I87.302 IDIOPATHIC CHRONIC VENOUS HYPERTENSION OF LEFT LOWER EXTREMITY WITHOUT COMPLICATIONS: Primary | ICD-10-CM

## 2023-08-23 PROCEDURE — 93971 EXTREMITY STUDY: CPT | Performed by: INTERNAL MEDICINE

## 2023-08-25 ENCOUNTER — TELEPHONE (OUTPATIENT)
Dept: CARDIOLOGY CLINIC | Age: 38
End: 2023-08-25

## 2023-08-25 NOTE — TELEPHONE ENCOUNTER
Called pt to tell them the results of their venous ultrasound as summarized below. Pt verbalized understanding. Lower extremity venous ultrasound (LEFT):     Summary      Left CFV is patent with good compressibility and respirophasic signal with   good augmentation. The Left GSV is non-compressible with no evidence of flow from the knee to   the distal calf.        Successfully ablated the vessel and there is no blood flow anymore

## 2023-08-30 ENCOUNTER — OFFICE VISIT (OUTPATIENT)
Dept: CARDIOLOGY CLINIC | Age: 38
End: 2023-08-30
Payer: COMMERCIAL

## 2023-08-30 VITALS
WEIGHT: 285.4 LBS | SYSTOLIC BLOOD PRESSURE: 124 MMHG | HEART RATE: 72 BPM | BODY MASS INDEX: 35.49 KG/M2 | HEIGHT: 75 IN | DIASTOLIC BLOOD PRESSURE: 72 MMHG

## 2023-08-30 DIAGNOSIS — I42.8 NON-ISCHEMIC CARDIOMYOPATHY (HCC): ICD-10-CM

## 2023-08-30 DIAGNOSIS — I10 ESSENTIAL HYPERTENSION: ICD-10-CM

## 2023-08-30 DIAGNOSIS — I83.893 VARICOSE VEINS OF BOTH LEGS WITH EDEMA: Primary | ICD-10-CM

## 2023-08-30 DIAGNOSIS — E11.9 TYPE 2 DIABETES MELLITUS WITHOUT COMPLICATION, WITHOUT LONG-TERM CURRENT USE OF INSULIN (HCC): ICD-10-CM

## 2023-08-30 DIAGNOSIS — G47.33 OBSTRUCTIVE SLEEP APNEA: ICD-10-CM

## 2023-08-30 PROCEDURE — 99213 OFFICE O/P EST LOW 20 MIN: CPT | Performed by: INTERNAL MEDICINE

## 2023-08-30 PROCEDURE — 3078F DIAST BP <80 MM HG: CPT | Performed by: INTERNAL MEDICINE

## 2023-08-30 PROCEDURE — 3074F SYST BP LT 130 MM HG: CPT | Performed by: INTERNAL MEDICINE

## 2023-08-30 NOTE — PATIENT INSTRUCTIONS
accordingly. MEDICATIONS: List of medications patient is currently taking is reviewed in detail with the patient. Discussed any side effects or problems taking the medication. Recommend Continue present management & medications as listed. AFFIRMATION: I spent at least 20 minutes of time reviewing patient's history, previous & current medical problems & all Labs + testing. This includes chart prep even prior to the vosit. Various goals are discussed and multiple questions answered. Relevant concelling performed. Office follow up with Dr. Donna Altman. To see ms as needed.

## 2023-08-30 NOTE — PROGRESS NOTES
Shola Chilel is a 30 y.o. male who has    CHIEF COMPLAINT AS FOLLOWS:  CHEST PAIN: Patient denies any C/O chest pains at this time. SOB: No C/O SOB at this time. LEG EDEMA:  leg edema & other CVI symptoms improved                           Skin discoloration with chord like vein in the Left Leg. PALPITATIONS: Denies any C/O Palpitations   DIZZINESS: No C/O Dizziness   SYNCOPE: None   OTHER/ ADDITIONAL COMPLAINTS:                                     HPI: Patient is here for F/U on his CMP, CVI & Dyslipidemia problems. CVI: S/P both GSV ablations. CMP: Patient has known dilated CMP. Being treated with guideline recommended medical / device therapy as stated below. Dyslipidemia: Patient has known mixed dyslipidemia. Has been treated with guideline recommended medical / physical/ diet therapy as stated below.                 Current Outpatient Medications   Medication Sig Dispense Refill    atorvastatin (LIPITOR) 20 MG tablet Take 1 tablet by mouth daily 90 tablet 3    albuterol sulfate HFA (VENTOLIN HFA) 108 (90 Base) MCG/ACT inhaler Inhale 2 puffs into the lungs every 4 hours as needed for Wheezing or Shortness of Breath 2 each 2    metFORMIN (GLUCOPHAGE-XR) 500 MG extended release tablet TAKE ONE TABLET BY MOUTH TWICE A DAY WITH MEALS 60 tablet 3    empagliflozin (JARDIANCE) 10 MG tablet Take 1 tablet by mouth daily 90 tablet 3    spironolactone (ALDACTONE) 25 MG tablet Take 1 tablet by mouth daily 90 tablet 1    furosemide (LASIX) 40 MG tablet Take 1.5 tablets by mouth daily 135 tablet 1    Alcohol Swabs PADS USE AS DIRECTED TO CLEAN SKIN PRIOR TO CHECKING BLOOD SUGAR 100 each 0    Prodigy Twist Top Lancets 28G MISC TEST BLOOD SUGAR ONCE DAILY 100 each 1    blood glucose test strips (PRODIGY NO CODING BLOOD GLUC) strip TEST BLOOD GLUCOSE ONCE DAILY AND AS NEEDED FOR SYMPTOMS OF IRREGULAR BLOOD GLUCOSE 100 strip 1    sertraline (ZOLOFT) 100 MG tablet TAKE ONE TABLET BY

## 2023-09-05 RX ORDER — BLOOD PRESSURE TEST KIT
KIT MISCELLANEOUS
Qty: 100 EACH | Refills: 1 | Status: SHIPPED | OUTPATIENT
Start: 2023-09-05

## 2023-09-14 ENCOUNTER — OFFICE VISIT (OUTPATIENT)
Dept: INTERNAL MEDICINE CLINIC | Age: 38
End: 2023-09-14
Payer: COMMERCIAL

## 2023-09-14 VITALS
WEIGHT: 284 LBS | HEART RATE: 79 BPM | DIASTOLIC BLOOD PRESSURE: 82 MMHG | BODY MASS INDEX: 35.31 KG/M2 | OXYGEN SATURATION: 97 % | HEIGHT: 75 IN | SYSTOLIC BLOOD PRESSURE: 122 MMHG

## 2023-09-14 DIAGNOSIS — E78.5 HYPERLIPIDEMIA, UNSPECIFIED HYPERLIPIDEMIA TYPE: ICD-10-CM

## 2023-09-14 DIAGNOSIS — E11.9 TYPE 2 DIABETES MELLITUS WITHOUT COMPLICATION, WITHOUT LONG-TERM CURRENT USE OF INSULIN (HCC): Primary | ICD-10-CM

## 2023-09-14 DIAGNOSIS — I10 ESSENTIAL HYPERTENSION: ICD-10-CM

## 2023-09-14 DIAGNOSIS — F41.9 ANXIETY: ICD-10-CM

## 2023-09-14 PROCEDURE — 3074F SYST BP LT 130 MM HG: CPT | Performed by: FAMILY MEDICINE

## 2023-09-14 PROCEDURE — 99214 OFFICE O/P EST MOD 30 MIN: CPT | Performed by: FAMILY MEDICINE

## 2023-09-14 PROCEDURE — 3079F DIAST BP 80-89 MM HG: CPT | Performed by: FAMILY MEDICINE

## 2023-09-14 RX ORDER — SERTRALINE HYDROCHLORIDE 100 MG/1
150 TABLET, FILM COATED ORAL DAILY
Qty: 45 TABLET | Refills: 3 | Status: SHIPPED | OUTPATIENT
Start: 2023-09-14

## 2023-09-14 RX ORDER — SERTRALINE HYDROCHLORIDE 100 MG/1
100 TABLET, FILM COATED ORAL DAILY
Qty: 30 TABLET | Refills: 2 | Status: CANCELLED | OUTPATIENT
Start: 2023-09-14

## 2023-09-14 ASSESSMENT — ENCOUNTER SYMPTOMS
CONSTIPATION: 0
DIARRHEA: 0
CHEST TIGHTNESS: 0
VOMITING: 0
SORE THROAT: 0
COLOR CHANGE: 0
SHORTNESS OF BREATH: 0
ABDOMINAL PAIN: 0

## 2023-09-14 NOTE — PROGRESS NOTES
Subjective:      Chief Complaint   Patient presents with    3 Month Follow-Up       HPI:  Annette Aguilar is a 30 y.o. male who presents today for follow up of chronic conditions as listed below. States he used to take K as needed for leg cramping, but stopped K about 4-5 months ago. Did not know if he needed to restart. Asthma:  Has been taking trelegy- still working well. Is only needing albuterol about 4-5 times a day instead of 20+ times a day as he was previously using. CHF:  Still following with cardiology, states he is still feeling significantly better than he was a year ago. Compliant with his medications. DM:  Glucose readings are usually averaging in 130's. Does still drink soda and knows that he still eats a lot of things he shouldn't, but states he is better than he used to be. Drinking much more water instead. States that his anxiety is not well controlled as he is now a  and feels that triggers his symptoms. Is currently taking zoloft 100mg. No acute concerns today. Did not get labs completed. Past Medical History:   Diagnosis Date    Anxiety     \"About Surgery\"    Asthma     \"As a child, no problems since then\"    Chronic back pain     Foot callus     \"Bilateral\"    Headache(784.0)     \"Use to have intense headaches\"    History of nuclear stress test 01/09/2023    Low EF 32 %Medium sized defect of moderate severity which is reversible involving anterolateral wall of myocardium    History of stress test 01/09/2023    Medium sized defect of moderate severity which is reversible involving anterolateral wall of myocardium. EF 32 %    Hx of echocardiogram 01/09/2023    EF is estimated at 30-35%. Severly enlarged right side of the heart. Hx of echocardiogram 01/09/2023    EF is estimated at 30-35%Severly enlarged right side of the heart. Grade II diastolic dysfunction.     Meningitis spinal 1987 or 1988    Phobia     \"Phobia Of Needles\"    Shortness of breath

## 2023-09-25 DIAGNOSIS — F41.9 ANXIETY: ICD-10-CM

## 2023-09-25 RX ORDER — SERTRALINE HYDROCHLORIDE 100 MG/1
100 TABLET, FILM COATED ORAL DAILY
Qty: 30 TABLET | Refills: 2 | OUTPATIENT
Start: 2023-09-25

## 2023-10-11 ENCOUNTER — HOSPITAL ENCOUNTER (OUTPATIENT)
Age: 38
Discharge: HOME | End: 2023-10-11
Payer: COMMERCIAL

## 2023-10-11 LAB
ALBUMIN SERPL-MCNC: 4.5 GM/DL (ref 3.4–5)
ALP BLD-CCNC: 102 IU/L (ref 40–128)
ALT SERPL-CCNC: 28 U/L (ref 10–40)
ANION GAP SERPL CALCULATED.3IONS-SCNC: 11 MMOL/L (ref 4–16)
AST SERPL-CCNC: 39 IU/L (ref 15–37)
BASOPHILS ABSOLUTE: 0 K/CU MM
BASOPHILS RELATIVE PERCENT: 0.3 % (ref 0–1)
BILIRUB SERPL-MCNC: 0.9 MG/DL (ref 0–1)
BUN SERPL-MCNC: 16 MG/DL (ref 6–23)
CALCIUM SERPL-MCNC: 9.5 MG/DL (ref 8.3–10.6)
CHLORIDE BLD-SCNC: 102 MMOL/L (ref 99–110)
CHOLEST SERPL-MCNC: 114 MG/DL
CO2: 25 MMOL/L (ref 21–32)
CREAT SERPL-MCNC: 0.9 MG/DL (ref 0.9–1.3)
DIFFERENTIAL TYPE: ABNORMAL
EOSINOPHILS ABSOLUTE: 0.2 K/CU MM
EOSINOPHILS RELATIVE PERCENT: 1.9 % (ref 0–3)
ESTIMATED AVERAGE GLUCOSE: 117 MG/DL
GFR SERPL CREATININE-BSD FRML MDRD: >60 ML/MIN/1.73M2
GLUCOSE SERPL-MCNC: 107 MG/DL (ref 70–99)
HBA1C MFR BLD: 5.7 % (ref 4.2–6.3)
HCT VFR BLD CALC: 44.7 % (ref 42–52)
HDLC SERPL-MCNC: 35 MG/DL
HEMOGLOBIN: 14.6 GM/DL (ref 13.5–18)
IMMATURE NEUTROPHIL %: 0.2 % (ref 0–0.43)
LDLC SERPL CALC-MCNC: 50 MG/DL
LYMPHOCYTES ABSOLUTE: 2.1 K/CU MM
LYMPHOCYTES RELATIVE PERCENT: 24.5 % (ref 24–44)
MCH RBC QN AUTO: 31 PG (ref 27–31)
MCHC RBC AUTO-ENTMCNC: 32.7 % (ref 32–36)
MCV RBC AUTO: 94.9 FL (ref 78–100)
MONOCYTES ABSOLUTE: 0.8 K/CU MM
MONOCYTES RELATIVE PERCENT: 9.4 % (ref 0–4)
NUCLEATED RBC %: 0 %
PDW BLD-RTO: 12.1 % (ref 11.7–14.9)
PLATELET # BLD: 195 K/CU MM (ref 140–440)
PMV BLD AUTO: 9.8 FL (ref 7.5–11.1)
POTASSIUM SERPL-SCNC: 4.5 MMOL/L (ref 3.5–5.1)
RBC # BLD: 4.71 M/CU MM (ref 4.6–6.2)
SEGMENTED NEUTROPHILS ABSOLUTE COUNT: 5.5 K/CU MM
SEGMENTED NEUTROPHILS RELATIVE PERCENT: 63.7 % (ref 36–66)
SODIUM BLD-SCNC: 138 MMOL/L (ref 135–145)
TOTAL IMMATURE NEUTOROPHIL: 0.02 K/CU MM
TOTAL NUCLEATED RBC: 0 K/CU MM
TOTAL PROTEIN: 7.5 GM/DL (ref 6.4–8.2)
TRIGL SERPL-MCNC: 146 MG/DL
WBC # BLD: 8.6 K/CU MM (ref 4–10.5)

## 2023-10-11 PROCEDURE — 36415 COLL VENOUS BLD VENIPUNCTURE: CPT

## 2023-10-11 PROCEDURE — 80053 COMPREHEN METABOLIC PANEL: CPT

## 2023-10-11 PROCEDURE — 83036 HEMOGLOBIN GLYCOSYLATED A1C: CPT

## 2023-10-11 PROCEDURE — 85025 COMPLETE CBC W/AUTO DIFF WBC: CPT

## 2023-10-11 PROCEDURE — 80061 LIPID PANEL: CPT

## 2023-10-16 DIAGNOSIS — E11.9 TYPE 2 DIABETES MELLITUS WITHOUT COMPLICATION, WITHOUT LONG-TERM CURRENT USE OF INSULIN (HCC): ICD-10-CM

## 2023-10-16 RX ORDER — METFORMIN HYDROCHLORIDE 500 MG/1
TABLET, EXTENDED RELEASE ORAL
Qty: 60 TABLET | Refills: 3 | Status: SHIPPED | OUTPATIENT
Start: 2023-10-16

## 2023-10-25 ENCOUNTER — PROCEDURE VISIT (OUTPATIENT)
Dept: CARDIOLOGY CLINIC | Age: 38
End: 2023-10-25
Payer: COMMERCIAL

## 2023-10-25 DIAGNOSIS — R06.02 SHORTNESS OF BREATH: ICD-10-CM

## 2023-10-25 DIAGNOSIS — E78.5 DYSLIPIDEMIA: ICD-10-CM

## 2023-10-25 DIAGNOSIS — I10 ESSENTIAL HYPERTENSION: ICD-10-CM

## 2023-10-25 DIAGNOSIS — I50.22 CHRONIC SYSTOLIC HEART FAILURE (HCC): ICD-10-CM

## 2023-10-25 DIAGNOSIS — I87.301 CHRONIC VENOUS HYPERTENSION INVOLVING RIGHT SIDE: Primary | ICD-10-CM

## 2023-10-25 DIAGNOSIS — I42.8 NON-ISCHEMIC CARDIOMYOPATHY (HCC): ICD-10-CM

## 2023-10-25 DIAGNOSIS — R60.0 EDEMA OF LOWER EXTREMITY: ICD-10-CM

## 2023-10-25 PROCEDURE — 93306 TTE W/DOPPLER COMPLETE: CPT | Performed by: INTERNAL MEDICINE

## 2023-10-25 PROCEDURE — 93971 EXTREMITY STUDY: CPT | Performed by: INTERNAL MEDICINE

## 2023-10-25 RX ORDER — METOPROLOL SUCCINATE 25 MG/1
25 TABLET, EXTENDED RELEASE ORAL DAILY
Qty: 30 TABLET | Refills: 5 | Status: SHIPPED | OUTPATIENT
Start: 2023-10-25

## 2023-10-26 ENCOUNTER — TELEPHONE (OUTPATIENT)
Dept: CARDIOLOGY CLINIC | Age: 38
End: 2023-10-26

## 2023-10-26 NOTE — TELEPHONE ENCOUNTER
10/25/23 ECHO     Summary   Left ventricular function is low normal, EF is estimated at 50-55%. Left ventricle size is normal.   Mild left ventricular hypertrophy. Grade I diastolic dysfunction. No regional wall motion abnormalities were detected. No significant valvular disease noted. No evidence of pericardial effusion. Compared to previous echo on 4/2023, EF has improved from 35% to 50%.      wireless customer not available      Pt notified of results and advised to keep future appt

## 2023-11-03 ENCOUNTER — TELEPHONE (OUTPATIENT)
Dept: CARDIOLOGY CLINIC | Age: 38
End: 2023-11-03

## 2023-11-03 NOTE — TELEPHONE ENCOUNTER
Called and advised:     Left ventricular function is low normal, EF is estimated at 50-55%. Left ventricle size is normal.   Mild left ventricular hypertrophy. Grade I diastolic dysfunction. No regional wall motion abnormalities were detected. No significant valvular disease noted. No evidence of pericardial effusion. Compared to previous echo on 4/2023, EF has improved from 35% to 50%.     Next appointment:  1/31/204 at 10 AM

## 2023-11-08 ENCOUNTER — OFFICE VISIT (OUTPATIENT)
Dept: CARDIOLOGY CLINIC | Age: 38
End: 2023-11-08
Payer: COMMERCIAL

## 2023-11-08 VITALS
HEIGHT: 74 IN | DIASTOLIC BLOOD PRESSURE: 80 MMHG | WEIGHT: 291.8 LBS | SYSTOLIC BLOOD PRESSURE: 120 MMHG | BODY MASS INDEX: 37.45 KG/M2 | HEART RATE: 76 BPM

## 2023-11-08 DIAGNOSIS — R60.9 SWELLING: ICD-10-CM

## 2023-11-08 DIAGNOSIS — I50.22 CHRONIC SYSTOLIC HEART FAILURE (HCC): ICD-10-CM

## 2023-11-08 DIAGNOSIS — I42.8 NON-ISCHEMIC CARDIOMYOPATHY (HCC): Primary | ICD-10-CM

## 2023-11-08 DIAGNOSIS — E78.5 DYSLIPIDEMIA: ICD-10-CM

## 2023-11-08 DIAGNOSIS — I83.893 VARICOSE VEINS OF BOTH LEGS WITH EDEMA: ICD-10-CM

## 2023-11-08 DIAGNOSIS — I10 ESSENTIAL HYPERTENSION: ICD-10-CM

## 2023-11-08 DIAGNOSIS — R06.02 SHORTNESS OF BREATH: ICD-10-CM

## 2023-11-08 PROCEDURE — 3074F SYST BP LT 130 MM HG: CPT | Performed by: INTERNAL MEDICINE

## 2023-11-08 PROCEDURE — 99214 OFFICE O/P EST MOD 30 MIN: CPT | Performed by: INTERNAL MEDICINE

## 2023-11-08 PROCEDURE — 3079F DIAST BP 80-89 MM HG: CPT | Performed by: INTERNAL MEDICINE

## 2023-11-08 RX ORDER — FUROSEMIDE 40 MG/1
60 TABLET ORAL DAILY
Qty: 135 TABLET | Refills: 5 | Status: CANCELLED | OUTPATIENT
Start: 2023-11-08

## 2023-11-08 RX ORDER — FUROSEMIDE 40 MG/1
60 TABLET ORAL
Qty: 90 TABLET | Refills: 1 | Status: SHIPPED | OUTPATIENT
Start: 2023-11-08

## 2023-11-08 RX ORDER — SPIRONOLACTONE 25 MG/1
25 TABLET ORAL DAILY
Qty: 90 TABLET | Refills: 5 | Status: SHIPPED | OUTPATIENT
Start: 2023-11-08

## 2023-11-08 RX ORDER — METOPROLOL SUCCINATE 25 MG/1
25 TABLET, EXTENDED RELEASE ORAL DAILY
Qty: 30 TABLET | Refills: 5 | Status: SHIPPED | OUTPATIENT
Start: 2023-11-08

## 2023-11-08 NOTE — PROGRESS NOTES
Mitchell Rogel MD                                  CARDIOLOGY  NOTE           Chief Complaint:    Chief Complaint   Patient presents with    Follow-up     Denies all other cardiac sx    Shortness of Breath     resolving       Follow-up severe nonischemic cardiomyopathy     Doing better  Voiding well  Breathing improved       LHC/RHC 2023      Severe non ischemic CMP with dilated RV and high LVEDP, PA, RA   pressures   Echo suggests RV dilation / Biventricular Failure      Recommendations   Obtain VQ scan as outpt   Will consider IR evaluation for thoracocentesis   Cont GDMT   CHF follow up      HPI:     Rosas Rodríguez is a 30y.o. year old male who presents to the clinic with chief complaint of shortness of breath lower extremity edema    Patient is 6 feet 2 inches and 386 pounds. Mentions that over the past several months he has been diagnosed with diabetes mellitus, has been diagnosed with lower extremity edema and is on Lasix. Patient mentions that he has been experiencing shortness of breath with little exertion as well as lower extremity edema. Denies any PND orthopnea. Patient has been previously diagnosed with sleep apnea however is noncompliant with sleep apnea  Patient is a former smoker who quit 9 months ago. Smokes marijuana 2-3 times per week. Denies any alcohol abuse. Family history is positive for premature CAD in his father who  at the age of 39 from heart attack, also was a drug abuser. Patient first cousin had congenital heart disease which was operated upon.     EKG in the office today shows sinus rhythm, sinus tachycardia    Current Outpatient Medications   Medication Sig Dispense Refill    empagliflozin (JARDIANCE) 10 MG tablet Take 1 tablet by mouth daily 90 tablet 5    metoprolol succinate (TOPROL XL) 25 MG extended release tablet Take 1 tablet by mouth daily 30 tablet 5    sacubitril-valsartan (ENTRESTO) 49-51 MG per tablet Take 1 tablet by mouth 2 times daily 60 tablet 5

## 2023-11-15 ENCOUNTER — OFFICE VISIT (OUTPATIENT)
Dept: PULMONOLOGY | Age: 38
End: 2023-11-15
Payer: COMMERCIAL

## 2023-11-15 VITALS
HEART RATE: 63 BPM | OXYGEN SATURATION: 96 % | HEIGHT: 74 IN | BODY MASS INDEX: 37.09 KG/M2 | SYSTOLIC BLOOD PRESSURE: 124 MMHG | WEIGHT: 289 LBS | RESPIRATION RATE: 16 BRPM | DIASTOLIC BLOOD PRESSURE: 68 MMHG

## 2023-11-15 DIAGNOSIS — J45.909 ASTHMA, UNSPECIFIED ASTHMA SEVERITY, UNSPECIFIED WHETHER COMPLICATED, UNSPECIFIED WHETHER PERSISTENT: ICD-10-CM

## 2023-11-15 DIAGNOSIS — G47.33 OBSTRUCTIVE SLEEP APNEA: Primary | ICD-10-CM

## 2023-11-15 PROCEDURE — 3074F SYST BP LT 130 MM HG: CPT | Performed by: NURSE PRACTITIONER

## 2023-11-15 PROCEDURE — 99214 OFFICE O/P EST MOD 30 MIN: CPT | Performed by: NURSE PRACTITIONER

## 2023-11-15 PROCEDURE — 3078F DIAST BP <80 MM HG: CPT | Performed by: NURSE PRACTITIONER

## 2023-11-29 DIAGNOSIS — E11.9 TYPE 2 DIABETES MELLITUS WITHOUT COMPLICATION, WITHOUT LONG-TERM CURRENT USE OF INSULIN (HCC): ICD-10-CM

## 2023-11-30 RX ORDER — LANCETS 28 GAUGE
EACH MISCELLANEOUS
Qty: 100 EACH | Refills: 1 | Status: SHIPPED | OUTPATIENT
Start: 2023-11-30

## 2023-11-30 RX ORDER — BLOOD SUGAR DIAGNOSTIC
STRIP MISCELLANEOUS
Qty: 100 STRIP | Refills: 1 | Status: SHIPPED | OUTPATIENT
Start: 2023-11-30

## 2023-12-14 ENCOUNTER — OFFICE VISIT (OUTPATIENT)
Dept: INTERNAL MEDICINE CLINIC | Age: 38
End: 2023-12-14
Payer: COMMERCIAL

## 2023-12-14 VITALS
DIASTOLIC BLOOD PRESSURE: 82 MMHG | OXYGEN SATURATION: 95 % | HEIGHT: 74 IN | BODY MASS INDEX: 36.19 KG/M2 | HEART RATE: 94 BPM | WEIGHT: 282 LBS | SYSTOLIC BLOOD PRESSURE: 120 MMHG

## 2023-12-14 DIAGNOSIS — J06.9 UPPER RESPIRATORY TRACT INFECTION, UNSPECIFIED TYPE: Primary | ICD-10-CM

## 2023-12-14 PROCEDURE — 99213 OFFICE O/P EST LOW 20 MIN: CPT | Performed by: FAMILY MEDICINE

## 2023-12-14 PROCEDURE — 3074F SYST BP LT 130 MM HG: CPT | Performed by: FAMILY MEDICINE

## 2023-12-14 PROCEDURE — 3079F DIAST BP 80-89 MM HG: CPT | Performed by: FAMILY MEDICINE

## 2023-12-14 RX ORDER — AZITHROMYCIN 250 MG/1
250 TABLET, FILM COATED ORAL SEE ADMIN INSTRUCTIONS
Qty: 6 TABLET | Refills: 0 | Status: SHIPPED | OUTPATIENT
Start: 2023-12-14 | End: 2023-12-19

## 2023-12-14 RX ORDER — PREDNISONE 20 MG/1
20 TABLET ORAL 2 TIMES DAILY
Qty: 10 TABLET | Refills: 0 | Status: SHIPPED | OUTPATIENT
Start: 2023-12-14 | End: 2023-12-19

## 2023-12-14 NOTE — PROGRESS NOTES
Left eye: No discharge. Extraocular Movements: Extraocular movements intact. Conjunctiva/sclera: Conjunctivae normal.   Cardiovascular:      Rate and Rhythm: Normal rate and regular rhythm. Heart sounds: Normal heart sounds. Pulmonary:      Effort: Pulmonary effort is normal.      Breath sounds: Normal breath sounds. No wheezing or rales. Musculoskeletal:         General: No deformity. Cervical back: Normal range of motion and neck supple. No rigidity. Skin:     General: Skin is warm and dry. Findings: No rash. Neurological:      General: No focal deficit present. Mental Status: He is alert. Mental status is at baseline. Motor: No weakness. Psychiatric:         Mood and Affect: Mood normal.         Behavior: Behavior normal.            Assessment / Plan:      1. Upper respiratory tract infection, unspecified type  Likely with asthma exacerbation 2/2 URI. Will treat with course of steroids and antibiotics, contact clinic if symptoms not improving with treatment. Continue supportive care. - predniSONE (DELTASONE) 20 MG tablet; Take 1 tablet by mouth 2 times daily for 5 days  Dispense: 10 tablet; Refill: 0  - azithromycin (ZITHROMAX) 250 MG tablet; Take 1 tablet by mouth See Admin Instructions for 5 days 500mg on day 1 followed by 250mg on days 2 - 5  Dispense: 6 tablet; Refill: 0          I have spent 20 minutes on this patient encounter. Patient voiced understanding and agreement with plan. All questions/concerns were addressed, risks/side effects of medications were reviewed. Return precautions and after visit summary were provided. Return if symptoms worsen or fail to improve. or earlier as needed.       Meenakshi Mckeon MD

## 2023-12-29 DIAGNOSIS — J45.40 MODERATE PERSISTENT ASTHMA WITHOUT COMPLICATION: ICD-10-CM

## 2023-12-29 RX ORDER — FLUTICASONE FUROATE, UMECLIDINIUM BROMIDE AND VILANTEROL TRIFENATATE 100; 62.5; 25 UG/1; UG/1; UG/1
POWDER RESPIRATORY (INHALATION)
Qty: 60 EACH | Refills: 0 | Status: SHIPPED | OUTPATIENT
Start: 2023-12-29 | End: 2024-02-14 | Stop reason: SDUPTHER

## 2024-01-04 RX ORDER — FUROSEMIDE 40 MG/1
40 TABLET ORAL
Qty: 135 TABLET | Refills: 1 | Status: SHIPPED | OUTPATIENT
Start: 2024-01-05

## 2024-01-14 DIAGNOSIS — E11.9 TYPE 2 DIABETES MELLITUS WITHOUT COMPLICATION, WITHOUT LONG-TERM CURRENT USE OF INSULIN (HCC): ICD-10-CM

## 2024-01-15 RX ORDER — METFORMIN HYDROCHLORIDE 500 MG/1
TABLET, EXTENDED RELEASE ORAL
Qty: 60 TABLET | Refills: 3 | Status: SHIPPED | OUTPATIENT
Start: 2024-01-15

## 2024-01-16 DIAGNOSIS — J45.40 MODERATE PERSISTENT ASTHMA WITHOUT COMPLICATION: ICD-10-CM

## 2024-01-16 RX ORDER — SPIRONOLACTONE 25 MG/1
25 TABLET ORAL DAILY
Qty: 90 TABLET | Refills: 3 | Status: SHIPPED | OUTPATIENT
Start: 2024-01-16

## 2024-01-16 RX ORDER — ALBUTEROL SULFATE 90 UG/1
2 AEROSOL, METERED RESPIRATORY (INHALATION) EVERY 4 HOURS PRN
Qty: 36 G | Refills: 0 | Status: SHIPPED | OUTPATIENT
Start: 2024-01-16 | End: 2024-02-14 | Stop reason: SDUPTHER

## 2024-01-18 ENCOUNTER — OFFICE VISIT (OUTPATIENT)
Dept: INTERNAL MEDICINE CLINIC | Age: 39
End: 2024-01-18
Payer: COMMERCIAL

## 2024-01-18 VITALS
BODY MASS INDEX: 37.35 KG/M2 | WEIGHT: 291 LBS | HEIGHT: 74 IN | HEART RATE: 88 BPM | DIASTOLIC BLOOD PRESSURE: 82 MMHG | SYSTOLIC BLOOD PRESSURE: 124 MMHG | OXYGEN SATURATION: 96 %

## 2024-01-18 DIAGNOSIS — F41.9 ANXIETY: ICD-10-CM

## 2024-01-18 DIAGNOSIS — J45.40 MODERATE PERSISTENT ASTHMA WITHOUT COMPLICATION: ICD-10-CM

## 2024-01-18 DIAGNOSIS — I50.22 CHRONIC SYSTOLIC HEART FAILURE (HCC): ICD-10-CM

## 2024-01-18 DIAGNOSIS — E11.9 TYPE 2 DIABETES MELLITUS WITHOUT COMPLICATION, WITHOUT LONG-TERM CURRENT USE OF INSULIN (HCC): Primary | ICD-10-CM

## 2024-01-18 DIAGNOSIS — I10 ESSENTIAL HYPERTENSION: ICD-10-CM

## 2024-01-18 DIAGNOSIS — E78.5 HYPERLIPIDEMIA, UNSPECIFIED HYPERLIPIDEMIA TYPE: ICD-10-CM

## 2024-01-18 PROCEDURE — 3074F SYST BP LT 130 MM HG: CPT | Performed by: FAMILY MEDICINE

## 2024-01-18 PROCEDURE — 3079F DIAST BP 80-89 MM HG: CPT | Performed by: FAMILY MEDICINE

## 2024-01-18 PROCEDURE — 99214 OFFICE O/P EST MOD 30 MIN: CPT | Performed by: FAMILY MEDICINE

## 2024-01-18 ASSESSMENT — PATIENT HEALTH QUESTIONNAIRE - PHQ9
SUM OF ALL RESPONSES TO PHQ QUESTIONS 1-9: 0
1. LITTLE INTEREST OR PLEASURE IN DOING THINGS: 0
SUM OF ALL RESPONSES TO PHQ QUESTIONS 1-9: 0
SUM OF ALL RESPONSES TO PHQ9 QUESTIONS 1 & 2: 0
2. FEELING DOWN, DEPRESSED OR HOPELESS: 0
SUM OF ALL RESPONSES TO PHQ QUESTIONS 1-9: 0
SUM OF ALL RESPONSES TO PHQ QUESTIONS 1-9: 0

## 2024-01-18 ASSESSMENT — ENCOUNTER SYMPTOMS
CONSTIPATION: 0
CHEST TIGHTNESS: 0
DIARRHEA: 0
COLOR CHANGE: 0
ABDOMINAL PAIN: 0
SORE THROAT: 0
VOMITING: 0
SHORTNESS OF BREATH: 0

## 2024-01-18 NOTE — PROGRESS NOTES
three times a week Yes Orville Montez MD   TRELEGY ELLIPTA 100-62.5-25 MCG/ACT AEPB inhaler INHALE 1 PUFF INTO THE LUNGS DAILY RINSE MOUTH AFTER USE Yes Negin Hill APRN - CNP   Prodigy Twist Top Lancets 28G MISC TEST BLOOD SUGAR ONCE DAILY Yes Silvia Urbano MD   blood glucose test strips (PRODIGY NO CODING BLOOD GLUC) strip TEST BLOOD SUGAR ONCE DAILY AND AS NEEDED FOR SYMPTOMS OF IRREGULAR BLOOD GLUCOSE Yes Silvia Urbano MD   empagliflozin (JARDIANCE) 10 MG tablet Take 1 tablet by mouth daily Yes Orville Montez MD   metoprolol succinate (TOPROL XL) 25 MG extended release tablet Take 1 tablet by mouth daily Yes Orville Montez MD   sacubitril-valsartan (ENTRESTO) 49-51 MG per tablet Take 1 tablet by mouth 2 times daily Yes Orville Montez MD   sertraline (ZOLOFT) 100 MG tablet Take 1.5 tablets by mouth daily Yes Silvia Urbano MD   Alcohol Swabs PADS USE AS DIRECTED TO CLEAN SKIN PRIOR TO CHECKING BLOOD SUGAR Yes Silvia Urbano MD   atorvastatin (LIPITOR) 20 MG tablet Take 1 tablet by mouth daily Yes Orville Montez MD   Ascorbic Acid (VITAMIN C) 250 MG tablet Take 1 tablet by mouth daily Yes Baldo Mathews MD   Cetirizine HCl (ZYRTEC PO) Take by mouth Yes Baldo Mathews MD   Blood Pressure KIT Check blood pressure daily Yes Day Goldsmith APRN - CNP   glucose monitoring (FREESTYLE FREEDOM) kit 1 kit by Does not apply route daily Yes Silvia Urbano MD          Objective:      /82   Pulse 88   Ht 1.88 m (6' 2.02\")   Wt 132 kg (291 lb)   SpO2 96%   BMI 37.35 kg/m²      Physical Exam  Vitals and nursing note reviewed.   Constitutional:       General: He is not in acute distress.     Appearance: Normal appearance. He is obese. He is not ill-appearing or toxic-appearing.   HENT:      Head: Normocephalic and atraumatic.      Right Ear: External ear normal.      Left Ear: External ear normal.      Nose: Nose normal.      Mouth/Throat:

## 2024-01-25 ENCOUNTER — TELEPHONE (OUTPATIENT)
Dept: CARDIOLOGY CLINIC | Age: 39
End: 2024-01-25

## 2024-01-25 NOTE — TELEPHONE ENCOUNTER
Called wife of pt to tell them the results of [place]. as summarized below. Wife of pt verbalized understanding.        Vascular US Duplex Lower Extremity Venous Left      Left Common Femoral Vein: Patent, normal phasicity, spontaneous, normal augmentation, compressible.    The Left GSV is non-compressible with no evidence of flow just from the knee to the distal calf.    The Left GSV Mid and Distal Thigh are partially compressible with no evidence of reflux, ablation material versus SVT (compare with previous exam 8/23/2023).

## 2024-01-31 ENCOUNTER — OFFICE VISIT (OUTPATIENT)
Dept: CARDIOLOGY CLINIC | Age: 39
End: 2024-01-31
Payer: COMMERCIAL

## 2024-01-31 VITALS
BODY MASS INDEX: 36.7 KG/M2 | WEIGHT: 286 LBS | DIASTOLIC BLOOD PRESSURE: 70 MMHG | HEIGHT: 74 IN | HEART RATE: 80 BPM | SYSTOLIC BLOOD PRESSURE: 124 MMHG

## 2024-01-31 DIAGNOSIS — I10 ESSENTIAL HYPERTENSION: ICD-10-CM

## 2024-01-31 DIAGNOSIS — G47.33 OBSTRUCTIVE SLEEP APNEA: ICD-10-CM

## 2024-01-31 DIAGNOSIS — I83.893 VARICOSE VEINS OF BOTH LEGS WITH EDEMA: Primary | ICD-10-CM

## 2024-01-31 DIAGNOSIS — E03.9 ACQUIRED HYPOTHYROIDISM: ICD-10-CM

## 2024-01-31 DIAGNOSIS — E66.01 CLASS 3 SEVERE OBESITY DUE TO EXCESS CALORIES WITHOUT SERIOUS COMORBIDITY WITH BODY MASS INDEX (BMI) OF 45.0 TO 49.9 IN ADULT (HCC): ICD-10-CM

## 2024-01-31 DIAGNOSIS — E11.9 TYPE 2 DIABETES MELLITUS WITHOUT COMPLICATION, WITHOUT LONG-TERM CURRENT USE OF INSULIN (HCC): ICD-10-CM

## 2024-01-31 PROCEDURE — 3074F SYST BP LT 130 MM HG: CPT | Performed by: INTERNAL MEDICINE

## 2024-01-31 PROCEDURE — 99213 OFFICE O/P EST LOW 20 MIN: CPT | Performed by: INTERNAL MEDICINE

## 2024-01-31 PROCEDURE — 3078F DIAST BP <80 MM HG: CPT | Performed by: INTERNAL MEDICINE

## 2024-01-31 NOTE — PATIENT INSTRUCTIONS
CHRONIC VENOUS INSUFFICIENCY:yes,               Patient had symptomatic C4 disease                1/12/2023    No evidence of DVT or SVT in the bilateral common femoral vein, femoral    vein, popliteal vein, greater saphenous vein or small saphenous vein.    Significant reflux noted of the Right CFV (1.4s), GSV Mid Thigh (1.2s), and    GSV Distal Calf (1.2s).    Significant reflux noted in the Left Mid FV (1.0s), GSV Knee (0.6s), and GSV    Distal Calf (4.2s).      S/P right &Left Ablations with Varithena.     5/31/2023   Right CFV and PTV are patent with good compressibility and respirophasic   signal with good augmentation.   The Right GSV is non-compressible with no evidence of flow just past the   saphenofemoral junction to the distal calf, as well as its tributaries of   the right calf.     8/23/2023   Left CFV is patent with good compressibility and respirophasic signal with   good augmentation.   The Left GSV is non-compressible with no evidence of flow from the knee to   the distal calf.    1/24/2024    Left Common Femoral Vein: Patent, normal phasicity, spontaneous, normal augmentation, compressible.    The Left GSV is non-compressible with no evidence of flow just from the knee to the distal calf.    The Left GSV Mid and Distal Thigh are partially compressible with no evidence of reflux, ablation material versus SVT (compare with previous exam 8/23/2023).        CARDIOMYOPATHY:Yes.  Being treated with guideline recommended medical / device therapy as stated below. On Entresto, Aldactone, toprol & Jardiance.     DYSLIPIDEMIA: yes,   Patient's profile is at / near Goal.yes,   HDL is low   Tolerating current medical regimen wellyes, . Takes Lipitor  See most recent Lab values:( Reviewed Labs from family Dr. ASHWINI     )  LDL is 55  HDL is 27     TESTS ORDERED:none this visit.     PREVIOUSLY ORDERED TESTS REVIEWED & DISCUSSED WITH THE PATIENT:     I personally reviewed & interpreted, all previously ordered tests

## 2024-02-03 DIAGNOSIS — F41.9 ANXIETY: ICD-10-CM

## 2024-02-05 RX ORDER — SERTRALINE HYDROCHLORIDE 100 MG/1
TABLET, FILM COATED ORAL
Qty: 45 TABLET | Refills: 5 | Status: SHIPPED | OUTPATIENT
Start: 2024-02-05

## 2024-02-14 ENCOUNTER — OFFICE VISIT (OUTPATIENT)
Dept: PULMONOLOGY | Age: 39
End: 2024-02-14
Payer: COMMERCIAL

## 2024-02-14 VITALS
HEIGHT: 74 IN | HEART RATE: 94 BPM | BODY MASS INDEX: 36.93 KG/M2 | OXYGEN SATURATION: 97 % | SYSTOLIC BLOOD PRESSURE: 132 MMHG | WEIGHT: 287.8 LBS | DIASTOLIC BLOOD PRESSURE: 84 MMHG

## 2024-02-14 DIAGNOSIS — G47.33 OBSTRUCTIVE SLEEP APNEA: Primary | ICD-10-CM

## 2024-02-14 DIAGNOSIS — J45.40 MODERATE PERSISTENT ASTHMA WITHOUT COMPLICATION: ICD-10-CM

## 2024-02-14 PROCEDURE — 99214 OFFICE O/P EST MOD 30 MIN: CPT | Performed by: NURSE PRACTITIONER

## 2024-02-14 PROCEDURE — 3079F DIAST BP 80-89 MM HG: CPT | Performed by: NURSE PRACTITIONER

## 2024-02-14 PROCEDURE — 3075F SYST BP GE 130 - 139MM HG: CPT | Performed by: NURSE PRACTITIONER

## 2024-02-14 RX ORDER — ALBUTEROL SULFATE 90 UG/1
2 AEROSOL, METERED RESPIRATORY (INHALATION) EVERY 4 HOURS PRN
Qty: 36 G | Refills: 5 | Status: SHIPPED | OUTPATIENT
Start: 2024-02-14

## 2024-02-14 RX ORDER — FLUTICASONE FUROATE, UMECLIDINIUM BROMIDE AND VILANTEROL TRIFENATATE 100; 62.5; 25 UG/1; UG/1; UG/1
1 POWDER RESPIRATORY (INHALATION) DAILY
Qty: 60 EACH | Refills: 5 | Status: SHIPPED | OUTPATIENT
Start: 2024-02-14

## 2024-02-14 NOTE — PROGRESS NOTES
Jonah Hays (:  1985) is a 38 y.o. male,Established patient, here for evaluation of the following chief complaint(s):  Follow-up (3mo f/u, needs inhaler refills)        Subjective   SUBJECTIVE/OBJECTIVE:  Jonah Hays 39 yo established male presents to pulmonary clinic for follow up on sleep apnea compliance, asthma management, and refills. Jonah states that he continues struggling with his CPAP mask. He is noncompliant with use. Today he states that he \"does not like wearing the PAP device.\" He endorses that he has diligently tried using it. He states that he feels like he no longer has any problems with sleep apnea since losing weight.  He wakes up feeling good. He has no fatigue during the day and is feeling pretty energetic.     He reports asthma being well controlled using Trelegy 100 mcg. He feels like a new person being able to breathe. He uses Albuterol on days when the weather is moist, damp, rainy or when there is pollen floating through the air.     Breath sounds are clear.       Review of Systems   Psychiatric/Behavioral:  Positive for sleep disturbance.         Sleep apnea dx. Not using the device    All other systems reviewed and are negative.         Objective   Physical Exam  Constitutional:       General: He is awake.      Appearance: Normal appearance. He is well-developed and well-groomed. He is obese.   HENT:      Mouth/Throat:      Mouth: Mucous membranes are moist.      Pharynx: Oropharynx is clear.   Eyes:      Conjunctiva/sclera: Conjunctivae normal.      Pupils: Pupils are equal, round, and reactive to light.   Cardiovascular:      Rate and Rhythm: Normal rate and regular rhythm.      Pulses: Normal pulses.      Heart sounds: Normal heart sounds.   Musculoskeletal:         General: Normal range of motion.      Cervical back: Normal range of motion and neck supple.   Skin:     General: Skin is warm and dry.      Capillary Refill: Capillary refill takes less than 2 seconds.

## 2024-02-26 RX ORDER — BLOOD PRESSURE TEST KIT
KIT MISCELLANEOUS
Qty: 100 EACH | Refills: 1 | Status: SHIPPED | OUTPATIENT
Start: 2024-02-26

## 2024-04-09 DIAGNOSIS — E11.9 TYPE 2 DIABETES MELLITUS WITHOUT COMPLICATION, WITHOUT LONG-TERM CURRENT USE OF INSULIN (HCC): ICD-10-CM

## 2024-04-09 RX ORDER — METFORMIN HYDROCHLORIDE 500 MG/1
TABLET, EXTENDED RELEASE ORAL
Qty: 180 TABLET | Refills: 0 | Status: SHIPPED | OUTPATIENT
Start: 2024-04-09

## 2024-04-24 ENCOUNTER — OFFICE VISIT (OUTPATIENT)
Dept: CARDIOLOGY CLINIC | Age: 39
End: 2024-04-24
Payer: COMMERCIAL

## 2024-04-24 VITALS
SYSTOLIC BLOOD PRESSURE: 126 MMHG | BODY MASS INDEX: 38.12 KG/M2 | HEART RATE: 72 BPM | WEIGHT: 297 LBS | HEIGHT: 74 IN | DIASTOLIC BLOOD PRESSURE: 78 MMHG

## 2024-04-24 DIAGNOSIS — I42.8 NON-ISCHEMIC CARDIOMYOPATHY (HCC): Primary | ICD-10-CM

## 2024-04-24 DIAGNOSIS — I10 ESSENTIAL HYPERTENSION: ICD-10-CM

## 2024-04-24 DIAGNOSIS — I87.2 VENOUS INSUFFICIENCY: ICD-10-CM

## 2024-04-24 DIAGNOSIS — Z87.891 FORMER SMOKER: ICD-10-CM

## 2024-04-24 DIAGNOSIS — G47.33 OBSTRUCTIVE SLEEP APNEA: ICD-10-CM

## 2024-04-24 DIAGNOSIS — E66.01 CLASS 2 SEVERE OBESITY DUE TO EXCESS CALORIES WITH SERIOUS COMORBIDITY AND BODY MASS INDEX (BMI) OF 38.0 TO 38.9 IN ADULT (HCC): ICD-10-CM

## 2024-04-24 DIAGNOSIS — E78.5 DYSLIPIDEMIA: ICD-10-CM

## 2024-04-24 PROBLEM — E66.812 CLASS 2 SEVERE OBESITY DUE TO EXCESS CALORIES WITH SERIOUS COMORBIDITY AND BODY MASS INDEX (BMI) OF 38.0 TO 38.9 IN ADULT: Status: ACTIVE | Noted: 2022-05-12

## 2024-04-24 PROCEDURE — 3078F DIAST BP <80 MM HG: CPT | Performed by: INTERNAL MEDICINE

## 2024-04-24 PROCEDURE — 99214 OFFICE O/P EST MOD 30 MIN: CPT | Performed by: INTERNAL MEDICINE

## 2024-04-24 PROCEDURE — 3074F SYST BP LT 130 MM HG: CPT | Performed by: INTERNAL MEDICINE

## 2024-04-24 NOTE — PATIENT INSTRUCTIONS
We are committed to providing you the best care possible.    If you receive a survey after visiting one of our offices, please take time to share your experience concerning your physician office visit.  These surveys are confidential and no health information about you is shared.    We are eager to improve for you and we are counting on your feedback to help make that happen.      Thank you for allowing us to care for you today!   We want to ensure we can follow your treatment plan and we strive to give you the best outcomes and experience possible.   If you ever have a life threatening emergency and call 911 - for an ambulance (EMS)   Our providers can only care for you at:   [facility] or [facility].   Even if you have someone take you or you drive yourself we can only care for you in a [facility] facility. Our providers are not setup at the other healthcare locations!   Please be informed that if you contact our office outside of normal business hours the physician on call cannot help with any scheduling or rescheduling issues, procedure instruction questions or any type of medication issue.    We advise you for any urgent/emergency that you go to the nearest emergency room!    PLEASE CALL OUR OFFICE DURING NORMAL BUSINESS HOURS    Monday - Friday   8 am to 5 pm    [place]: [phone]    [place]: [phone]    [place]:  [phone]  **It is YOUR responsibilty to bring medication bottles and/or updated medication list to EACH APPOINTMENT. This will allow us to better serve you and all your healthcare needs**

## 2024-04-24 NOTE — PROGRESS NOTES
Orville Montez MD                                  CARDIOLOGY  NOTE           Chief Complaint:    Chief Complaint   Patient presents with    6 Month Follow-Up     Pt denies any new cardiac sx no surgeries or procedures scheduled that he is aware of and no refills needed       Follow-up severe nonischemic cardiomyopathy     Doing better  Voiding well  Breathing improved       LHC/RHC 2023      Severe non ischemic CMP with dilated RV and high LVEDP, PA, RA   pressures   Echo suggests RV dilation / Biventricular Failure      Recommendations   Obtain VQ scan as outpt   Will consider IR evaluation for thoracocentesis   Cont GDMT   CHF follow up      HPI:     Jonah is a 38 y.o. year old male who presents to the clinic with chief complaint of shortness of breath lower extremity edema    Patient is 6 feet 2 inches and 386 pounds.  Mentions that over the past several months he has been diagnosed with diabetes mellitus, has been diagnosed with lower extremity edema and is on Lasix.  Patient mentions that he has been experiencing shortness of breath with little exertion as well as lower extremity edema.  Denies any PND orthopnea.  Patient has been previously diagnosed with sleep apnea however is noncompliant with sleep apnea  Patient is a former smoker who quit 9 months ago.  Smokes marijuana 2-3 times per week.  Denies any alcohol abuse.  Family history is positive for premature CAD in his father who  at the age of 45 from heart attack, also was a drug abuser.  Patient first cousin had congenital heart disease which was operated upon.    EKG in the office today shows sinus rhythm, sinus tachycardia    Current Outpatient Medications   Medication Sig Dispense Refill    metFORMIN (GLUCOPHAGE-XR) 500 MG extended release tablet TAKE ONE TABLET BY MOUTH TWICE A DAY WITH MEALS 180 tablet 0    Alcohol Swabs PADS USE AS DIRECTED TO CLEAN SKIN PRIOR TO CHECKING BLOOD SUGAR 100 each 1    albuterol sulfate HFA

## 2024-05-14 RX ORDER — METOPROLOL SUCCINATE 25 MG/1
25 TABLET, EXTENDED RELEASE ORAL DAILY
Qty: 30 TABLET | Refills: 5 | Status: SHIPPED | OUTPATIENT
Start: 2024-05-14

## 2024-05-14 RX ORDER — SACUBITRIL AND VALSARTAN 49; 51 MG/1; MG/1
1 TABLET, FILM COATED ORAL 2 TIMES DAILY
Qty: 60 TABLET | Refills: 5 | Status: SHIPPED | OUTPATIENT
Start: 2024-05-14

## 2024-05-26 DIAGNOSIS — E11.9 TYPE 2 DIABETES MELLITUS WITHOUT COMPLICATION, WITHOUT LONG-TERM CURRENT USE OF INSULIN (HCC): ICD-10-CM

## 2024-05-28 RX ORDER — LANCETS 28 GAUGE
EACH MISCELLANEOUS
Qty: 100 EACH | Refills: 1 | Status: SHIPPED | OUTPATIENT
Start: 2024-05-28

## 2024-05-28 RX ORDER — BLOOD SUGAR DIAGNOSTIC
STRIP MISCELLANEOUS
Qty: 100 STRIP | Refills: 1 | Status: SHIPPED | OUTPATIENT
Start: 2024-05-28

## 2024-07-12 ENCOUNTER — HOSPITAL ENCOUNTER (EMERGENCY)
Age: 39
Discharge: HOME | End: 2024-07-12
Payer: COMMERCIAL

## 2024-07-12 ENCOUNTER — APPOINTMENT (OUTPATIENT)
Dept: CT IMAGING | Age: 39
End: 2024-07-12
Payer: COMMERCIAL

## 2024-07-12 ENCOUNTER — APPOINTMENT (OUTPATIENT)
Dept: GENERAL RADIOLOGY | Age: 39
End: 2024-07-12
Payer: COMMERCIAL

## 2024-07-12 VITALS
TEMPERATURE: 99.3 F | DIASTOLIC BLOOD PRESSURE: 84 MMHG | BODY MASS INDEX: 37.22 KG/M2 | HEIGHT: 74 IN | SYSTOLIC BLOOD PRESSURE: 132 MMHG | OXYGEN SATURATION: 93 % | RESPIRATION RATE: 16 BRPM | WEIGHT: 290 LBS | HEART RATE: 86 BPM

## 2024-07-12 DIAGNOSIS — R42 LIGHTHEADEDNESS: Primary | ICD-10-CM

## 2024-07-12 DIAGNOSIS — R19.7 NAUSEA VOMITING AND DIARRHEA: ICD-10-CM

## 2024-07-12 DIAGNOSIS — J18.9 PNEUMONIA OF LEFT LUNG DUE TO INFECTIOUS ORGANISM, UNSPECIFIED PART OF LUNG: ICD-10-CM

## 2024-07-12 DIAGNOSIS — R11.2 NAUSEA VOMITING AND DIARRHEA: ICD-10-CM

## 2024-07-12 LAB
ALBUMIN SERPL-MCNC: 4.5 GM/DL (ref 3.4–5)
ALP BLD-CCNC: 92 IU/L (ref 40–128)
ALT SERPL-CCNC: 24 U/L (ref 10–40)
ANION GAP SERPL CALCULATED.3IONS-SCNC: 14 MMOL/L (ref 7–16)
AST SERPL-CCNC: 44 IU/L (ref 15–37)
BASOPHILS ABSOLUTE: 0 K/CU MM
BASOPHILS RELATIVE PERCENT: 0.3 % (ref 0–1)
BILIRUB SERPL-MCNC: 1.3 MG/DL (ref 0–1)
BILIRUBIN, URINE: NEGATIVE MG/DL
BLOOD, URINE: NEGATIVE
BUN SERPL-MCNC: 13 MG/DL (ref 6–23)
CALCIUM SERPL-MCNC: 9.1 MG/DL (ref 8.3–10.6)
CHLORIDE BLD-SCNC: 101 MMOL/L (ref 99–110)
CLARITY, UA: CLEAR
CO2: 23 MMOL/L (ref 21–32)
COLOR, UA: YELLOW
COMMENT UA: ABNORMAL
CREAT SERPL-MCNC: 0.9 MG/DL (ref 0.9–1.3)
D-DIMER QUANTITATIVE: 0.57 UG/ML (FEU)
DIFFERENTIAL TYPE: ABNORMAL
EOSINOPHILS ABSOLUTE: 0.1 K/CU MM
EOSINOPHILS RELATIVE PERCENT: 1 % (ref 0–3)
GFR, ESTIMATED: >90 ML/MIN/1.73M2
GLUCOSE SERPL-MCNC: 101 MG/DL (ref 70–99)
GLUCOSE URINE: >1000 MG/DL
HCT VFR BLD CALC: 45.2 % (ref 42–52)
HEMOGLOBIN: 14.5 GM/DL (ref 13.5–18)
IMMATURE NEUTROPHIL %: 0.1 % (ref 0–0.43)
KETONES, URINE: ABNORMAL MG/DL
LACTIC ACID, SEPSIS: 1.2 MMOL/L (ref 0.4–2)
LEUKOCYTE ESTERASE, URINE: NEGATIVE
LYMPHOCYTES ABSOLUTE: 1.2 K/CU MM
LYMPHOCYTES RELATIVE PERCENT: 16.6 % (ref 24–44)
MAGNESIUM: 1.9 MG/DL (ref 1.8–2.4)
MCH RBC QN AUTO: 30.5 PG (ref 27–31)
MCHC RBC AUTO-ENTMCNC: 32.1 % (ref 32–36)
MCV RBC AUTO: 95.2 FL (ref 78–100)
MONOCYTES ABSOLUTE: 0.6 K/CU MM
MONOCYTES RELATIVE PERCENT: 8.6 % (ref 0–4)
NEUTROPHILS ABSOLUTE: 5.4 K/CU MM
NEUTROPHILS RELATIVE PERCENT: 73.4 % (ref 36–66)
NITRITE URINE, QUANTITATIVE: NEGATIVE
NUCLEATED RBC %: 0 %
PDW BLD-RTO: 12.8 % (ref 11.7–14.9)
PH, URINE: 6 (ref 5–8)
PLATELET # BLD: 137 K/CU MM (ref 140–440)
PMV BLD AUTO: 10.7 FL (ref 7.5–11.1)
POTASSIUM SERPL-SCNC: 4.1 MMOL/L (ref 3.5–5.1)
PRO-BNP: 124.8 PG/ML
PROTEIN UA: NEGATIVE MG/DL
RBC # BLD: 4.75 M/CU MM (ref 4.6–6.2)
SODIUM BLD-SCNC: 138 MMOL/L (ref 135–145)
SPECIFIC GRAVITY UA: <1.005 (ref 1–1.03)
TOTAL IMMATURE NEUTOROPHIL: 0.01 K/CU MM
TOTAL NUCLEATED RBC: 0 K/CU MM
TOTAL PROTEIN: 8.5 GM/DL (ref 6.4–8.2)
TROPONIN, HIGH SENSITIVITY: 35 NG/L (ref 0–22)
TROPONIN, HIGH SENSITIVITY: 35 NG/L (ref 0–22)
UROBILINOGEN, URINE: 0.2 MG/DL (ref 0.2–1)
WBC # BLD: 7.4 K/CU MM (ref 4–10.5)

## 2024-07-12 PROCEDURE — 83735 ASSAY OF MAGNESIUM: CPT

## 2024-07-12 PROCEDURE — 0202U NFCT DS 22 TRGT SARS-COV-2: CPT

## 2024-07-12 PROCEDURE — 2580000003 HC RX 258: Performed by: PHYSICIAN ASSISTANT

## 2024-07-12 PROCEDURE — 85025 COMPLETE CBC W/AUTO DIFF WBC: CPT

## 2024-07-12 PROCEDURE — 6370000000 HC RX 637 (ALT 250 FOR IP): Performed by: PHYSICIAN ASSISTANT

## 2024-07-12 PROCEDURE — 84484 ASSAY OF TROPONIN QUANT: CPT

## 2024-07-12 PROCEDURE — 85379 FIBRIN DEGRADATION QUANT: CPT

## 2024-07-12 PROCEDURE — 71045 X-RAY EXAM CHEST 1 VIEW: CPT

## 2024-07-12 PROCEDURE — 93005 ELECTROCARDIOGRAM TRACING: CPT | Performed by: PHYSICIAN ASSISTANT

## 2024-07-12 PROCEDURE — 99285 EMERGENCY DEPT VISIT HI MDM: CPT

## 2024-07-12 PROCEDURE — 94640 AIRWAY INHALATION TREATMENT: CPT

## 2024-07-12 PROCEDURE — 83605 ASSAY OF LACTIC ACID: CPT

## 2024-07-12 PROCEDURE — 81003 URINALYSIS AUTO W/O SCOPE: CPT

## 2024-07-12 PROCEDURE — 80053 COMPREHEN METABOLIC PANEL: CPT

## 2024-07-12 PROCEDURE — 6360000004 HC RX CONTRAST MEDICATION: Performed by: PHYSICIAN ASSISTANT

## 2024-07-12 PROCEDURE — 83880 ASSAY OF NATRIURETIC PEPTIDE: CPT

## 2024-07-12 PROCEDURE — 71275 CT ANGIOGRAPHY CHEST: CPT

## 2024-07-12 RX ORDER — PREDNISONE 50 MG/1
50 TABLET ORAL DAILY
Qty: 5 TABLET | Refills: 0 | Status: SHIPPED | OUTPATIENT
Start: 2024-07-12 | End: 2024-07-17

## 2024-07-12 RX ORDER — ACETAMINOPHEN 500 MG
1000 TABLET ORAL ONCE
Status: COMPLETED | OUTPATIENT
Start: 2024-07-12 | End: 2024-07-12

## 2024-07-12 RX ORDER — 0.9 % SODIUM CHLORIDE 0.9 %
500 INTRAVENOUS SOLUTION INTRAVENOUS ONCE
Status: COMPLETED | OUTPATIENT
Start: 2024-07-12 | End: 2024-07-12

## 2024-07-12 RX ORDER — GUAIFENESIN/DEXTROMETHORPHAN 100-10MG/5
5 SYRUP ORAL 3 TIMES DAILY PRN
Qty: 120 ML | Refills: 0 | Status: SHIPPED | OUTPATIENT
Start: 2024-07-12 | End: 2024-07-17

## 2024-07-12 RX ORDER — IPRATROPIUM BROMIDE AND ALBUTEROL SULFATE 2.5; .5 MG/3ML; MG/3ML
1 SOLUTION RESPIRATORY (INHALATION) ONCE
Status: COMPLETED | OUTPATIENT
Start: 2024-07-12 | End: 2024-07-12

## 2024-07-12 RX ORDER — GUAIFENESIN/DEXTROMETHORPHAN 100-10MG/5
5 SYRUP ORAL ONCE
Status: COMPLETED | OUTPATIENT
Start: 2024-07-12 | End: 2024-07-12

## 2024-07-12 RX ORDER — DOXYCYCLINE HYCLATE 100 MG
100 TABLET ORAL 2 TIMES DAILY
Qty: 14 TABLET | Refills: 0 | Status: SHIPPED | OUTPATIENT
Start: 2024-07-12 | End: 2024-07-19

## 2024-07-12 RX ORDER — SODIUM CHLORIDE 0.9 % (FLUSH) 0.9 %
5-40 SYRINGE (ML) INJECTION 2 TIMES DAILY
Status: DISCONTINUED | OUTPATIENT
Start: 2024-07-12 | End: 2024-07-12 | Stop reason: HOSPADM

## 2024-07-12 RX ORDER — ONDANSETRON 4 MG/1
4 TABLET, FILM COATED ORAL EVERY 8 HOURS PRN
Qty: 10 TABLET | Refills: 0 | Status: SHIPPED | OUTPATIENT
Start: 2024-07-12

## 2024-07-12 RX ADMIN — ACETAMINOPHEN 1000 MG: 500 TABLET ORAL at 15:35

## 2024-07-12 RX ADMIN — IPRATROPIUM BROMIDE AND ALBUTEROL SULFATE 1 DOSE: .5; 2.5 SOLUTION RESPIRATORY (INHALATION) at 15:25

## 2024-07-12 RX ADMIN — SODIUM CHLORIDE 500 ML: 9 INJECTION, SOLUTION INTRAVENOUS at 15:58

## 2024-07-12 RX ADMIN — GUAIFENESIN AND DEXTROMETHORPHAN 5 ML: 100; 10 SYRUP ORAL at 15:35

## 2024-07-12 RX ADMIN — IOPAMIDOL 75 ML: 755 INJECTION, SOLUTION INTRAVENOUS at 20:17

## 2024-07-12 ASSESSMENT — PAIN DESCRIPTION - DESCRIPTORS
DESCRIPTORS: ACHING
DESCRIPTORS: DISCOMFORT

## 2024-07-12 ASSESSMENT — PAIN - FUNCTIONAL ASSESSMENT
PAIN_FUNCTIONAL_ASSESSMENT: 0-10
PAIN_FUNCTIONAL_ASSESSMENT: ACTIVITIES ARE NOT PREVENTED
PAIN_FUNCTIONAL_ASSESSMENT: 0-10

## 2024-07-12 ASSESSMENT — PAIN DESCRIPTION - LOCATION
LOCATION: GENERALIZED
LOCATION: GENERALIZED

## 2024-07-12 ASSESSMENT — PAIN SCALES - GENERAL
PAINLEVEL_OUTOF10: 3
PAINLEVEL_OUTOF10: 3
PAINLEVEL_OUTOF10: 0

## 2024-07-12 ASSESSMENT — PAIN DESCRIPTION - ORIENTATION: ORIENTATION: LOWER;POSTERIOR

## 2024-07-12 NOTE — ED PROVIDER NOTES
EMERGENCY DEPARTMENT ENCOUNTER        Pt Name: Jonah Hays  MRN: [id number]  Birthdate 1985  Date of evaluation: 7/12/2024  Provider: COLIN COCHRAN  PCP: Silvia Urbano MD    STEPHANIE. I have evaluated this patient.        Triage CHIEF COMPLAINT       Chief Complaint   Patient presents with    Nausea    Cough    Dizziness    Diarrhea         HISTORY OF PRESENT ILLNESS      Chief Complaint: Malaise, dizziness, near syncope, nausea vomiting diarrhea    Jonah Hays is a 38 y.o.  male who presents to the ED via private vehicle after having an episode of near syncope, dizziness while at work.  Patient states that over the last several days he has had viral infection like symptoms, he states has been malaise, been having nausea and vomiting, has had a worsening cough.  He describes no fever but has had some congestion sore throat.  States that he was at work today and has had decreased oral intake and then had an episode of diarrhea and then vomiting.  After this episode he then he states that he stood up, he states that he became pale and white, felt as if he was going to pass out.  He had to sit down prior to full loss of consciousness.  Describes no chest pain, no obvious palpitations.  Patient does have history of nonischemic cardiomyopathy, without history of heart failure.  No history of blood clots.  Patient denies any other sick contacts, he is a diabetic, states his blood sugar has been overall well.  He states that he still feeling pretty bad, he states he does get short of breath with dyspnea on minimal exertion.    Nursing Notes were all reviewed and agreed with or any disagreements were addressed in the HPI.    REVIEW OF SYSTEMS     At least 10 systems reviewed and otherwise acutely negative except as in the [place].     PAST MEDICAL HISTORY     Past Medical History:   Diagnosis Date    Anxiety     \"About Surgery\"    Asthma     \"As a child, no problems since then\"    Chronic back pain   we did send a D-dimer that was also elevated.  The rest of his lab work is otherwise reassuring.  We will send him down for CTA to rule out the likelihood of acute intrathoracic pathology.  Initial chest x-ray demonstrated no signs of acute infection.    Did have to send repeat respiratory disease panel which did delay care, there was a delay in CT to secondary to volume in the emergency department.    CT scan negative for underlying PE, that does show signs of left sided pneumonia.  Patient's repeat troponin stayed the same at 35, on repeat evaluation he is hemodynamically stable having no active chest pain.  He is motivated to be discharged.  At this point we will pneumonia secondary to COPD and his presentation.  Will advise the importance of hydration.  Will place him on doxycycline, prednisone.  Will also be given cough medication, nausea medication.  Will advise close monitoring symptoms, strict return precautions.  Patient otherwise be discharged home in stable condition.    History from : Patient    Limitations to history : None    Patient was given the following medications:  Medications   sodium chloride flush 0.9 % injection 5-40 mL (has no administration in time range)   sodium chloride 0.9 % bolus 500 mL (0 mLs IntraVENous Stopped 7/12/24 1658)   guaiFENesin-dextromethorphan (ROBITUSSIN DM) 100-10 MG/5ML syrup 5 mL (5 mLs Oral Given 7/12/24 1535)   acetaminophen (TYLENOL) tablet 1,000 mg (1,000 mg Oral Given 7/12/24 1535)   ipratropium 0.5 mg-albuterol 2.5 mg (DUONEB) nebulizer solution 1 Dose (1 Dose Inhalation Given 7/12/24 1525)   iopamidol (ISOVUE-370) 76 % injection 75 mL (75 mLs IntraVENous Given 7/12/24 2017)       Independent Imaging Interpretation by me: Chest x-ray demonstrated no signs of acute cardiopulmonary process, CTA pulmonary study demonstrates-no PE, signs of possible pneumonia to the left lower lung field    EKG (if obtained): See supervising physician's note for EKG interpretation

## 2024-07-12 NOTE — ED TRIAGE NOTES
Pt arrived to ED with c/o nausea, cough, dizziness, and diarrhea for last three days. Pt states his kids have been sick as well. States he \"cannot stop sweating\". A/o and on room air. States he also cut his left thumb a few days ago and thinks antibiotics would help keep an infection away to be safe.

## 2024-07-13 LAB
EKG ATRIAL RATE: 85 BPM
EKG DIAGNOSIS: NORMAL
EKG P AXIS: 76 DEGREES
EKG P-R INTERVAL: 136 MS
EKG Q-T INTERVAL: 352 MS
EKG QRS DURATION: 84 MS
EKG QTC CALCULATION (BAZETT): 418 MS
EKG R AXIS: 21 DEGREES
EKG T AXIS: 63 DEGREES
EKG VENTRICULAR RATE: 85 BPM

## 2024-07-13 PROCEDURE — 93010 ELECTROCARDIOGRAM REPORT: CPT | Performed by: INTERNAL MEDICINE

## 2024-07-17 DIAGNOSIS — J45.40 MODERATE PERSISTENT ASTHMA WITHOUT COMPLICATION: ICD-10-CM

## 2024-07-17 RX ORDER — FLUTICASONE FUROATE, UMECLIDINIUM BROMIDE AND VILANTEROL TRIFENATATE 100; 62.5; 25 UG/1; UG/1; UG/1
POWDER RESPIRATORY (INHALATION)
Qty: 60 EACH | Refills: 0 | Status: SHIPPED | OUTPATIENT
Start: 2024-07-17

## 2024-07-22 ENCOUNTER — OFFICE VISIT (OUTPATIENT)
Dept: INTERNAL MEDICINE CLINIC | Age: 39
End: 2024-07-22
Payer: COMMERCIAL

## 2024-07-22 VITALS
HEART RATE: 82 BPM | DIASTOLIC BLOOD PRESSURE: 70 MMHG | BODY MASS INDEX: 35.82 KG/M2 | WEIGHT: 279 LBS | OXYGEN SATURATION: 95 % | SYSTOLIC BLOOD PRESSURE: 116 MMHG

## 2024-07-22 DIAGNOSIS — J18.9 PNEUMONIA OF LEFT LOWER LOBE DUE TO INFECTIOUS ORGANISM: Primary | ICD-10-CM

## 2024-07-22 PROCEDURE — 99213 OFFICE O/P EST LOW 20 MIN: CPT

## 2024-07-22 PROCEDURE — 3078F DIAST BP <80 MM HG: CPT

## 2024-07-22 PROCEDURE — 3074F SYST BP LT 130 MM HG: CPT

## 2024-07-22 ASSESSMENT — ENCOUNTER SYMPTOMS
CHEST TIGHTNESS: 0
SHORTNESS OF BREATH: 1
CONSTIPATION: 0
CHOKING: 0
ABDOMINAL PAIN: 0
COUGH: 0
COLOR CHANGE: 0
EYE PAIN: 0
EYE DISCHARGE: 0
VOMITING: 0
STRIDOR: 0
TROUBLE SWALLOWING: 0
EYE REDNESS: 0
RHINORRHEA: 0
FACIAL SWELLING: 0
NAUSEA: 0
SORE THROAT: 0
DIARRHEA: 0
WHEEZING: 0

## 2024-07-22 ASSESSMENT — VISUAL ACUITY: OU: 1

## 2024-07-22 NOTE — PROGRESS NOTES
normal.         Mood and Affect: Mood and affect normal.         Speech: Speech normal.         Behavior: Behavior normal. Behavior is cooperative.         Thought Content: Thought content normal.         Cognition and Memory: Cognition and memory normal.         Judgment: Judgment normal.          Assessment / Plan:        1. Pneumonia of left lower lobe due to infectious organism  Improving, continue current measures at home. No changes to plan of care. Questions answered at time of appointment and patient agrees with plan of care.           I have spent 20 minutes on this patient encounter.     Patient voiced understanding and agreement with plan.  All questions/concerns were addressed, risks/side effects of medications were reviewed.  Return precautions and after visit summary were provided.  Return if symptoms worsen or fail to improve. or earlier as needed.      Please note this report has been produced using speech recognition software and may contain errors related to that system including errors in grammar, punctuation, and spelling, as well as words and phrases that may be inappropriate. If there are any questions or concerns please feel free to contact the dictating provider for clarification.    LIZ Sawyer - CNP

## 2024-09-20 ENCOUNTER — TELEPHONE (OUTPATIENT)
Dept: INTERNAL MEDICINE CLINIC | Age: 39
End: 2024-09-20

## 2024-09-20 DIAGNOSIS — J45.40 MODERATE PERSISTENT ASTHMA WITHOUT COMPLICATION: ICD-10-CM

## 2024-09-20 RX ORDER — ALBUTEROL SULFATE 90 UG/1
2 INHALANT RESPIRATORY (INHALATION) EVERY 4 HOURS PRN
Qty: 36 G | Refills: 0 | OUTPATIENT
Start: 2024-09-20

## 2024-09-20 RX ORDER — ALBUTEROL SULFATE 90 UG/1
2 INHALANT RESPIRATORY (INHALATION) EVERY 4 HOURS PRN
Qty: 1 EACH | Refills: 5 | OUTPATIENT
Start: 2024-09-20

## 2024-09-23 RX ORDER — ALBUTEROL SULFATE 90 UG/1
2 INHALANT RESPIRATORY (INHALATION) EVERY 4 HOURS PRN
Qty: 36 G | Refills: 5 | Status: SHIPPED | OUTPATIENT
Start: 2024-09-23

## 2024-11-18 DIAGNOSIS — E11.9 TYPE 2 DIABETES MELLITUS WITHOUT COMPLICATION, WITHOUT LONG-TERM CURRENT USE OF INSULIN (HCC): ICD-10-CM

## 2024-11-18 RX ORDER — LANCETS 28 GAUGE
EACH MISCELLANEOUS
Qty: 100 EACH | Refills: 1 | Status: SHIPPED | OUTPATIENT
Start: 2024-11-18

## 2024-11-18 RX ORDER — BLOOD SUGAR DIAGNOSTIC
STRIP MISCELLANEOUS
Qty: 100 STRIP | Refills: 1 | Status: SHIPPED | OUTPATIENT
Start: 2024-11-18

## 2024-11-19 ENCOUNTER — OFFICE VISIT (OUTPATIENT)
Dept: INTERNAL MEDICINE CLINIC | Age: 39
End: 2024-11-19

## 2024-11-19 VITALS
HEART RATE: 62 BPM | DIASTOLIC BLOOD PRESSURE: 70 MMHG | HEIGHT: 74 IN | SYSTOLIC BLOOD PRESSURE: 112 MMHG | BODY MASS INDEX: 36.19 KG/M2 | WEIGHT: 282 LBS | OXYGEN SATURATION: 97 %

## 2024-11-19 DIAGNOSIS — E11.9 TYPE 2 DIABETES MELLITUS WITHOUT COMPLICATION, WITHOUT LONG-TERM CURRENT USE OF INSULIN (HCC): Primary | ICD-10-CM

## 2024-11-19 DIAGNOSIS — J06.9 UPPER RESPIRATORY TRACT INFECTION, UNSPECIFIED TYPE: ICD-10-CM

## 2024-11-19 DIAGNOSIS — I10 ESSENTIAL HYPERTENSION: ICD-10-CM

## 2024-11-19 DIAGNOSIS — E78.5 HYPERLIPIDEMIA, UNSPECIFIED HYPERLIPIDEMIA TYPE: ICD-10-CM

## 2024-11-19 DIAGNOSIS — F41.9 ANXIETY: ICD-10-CM

## 2024-11-19 PROBLEM — I50.23 ACUTE ON CHRONIC SYSTOLIC CONGESTIVE HEART FAILURE (HCC): Status: RESOLVED | Noted: 2023-01-13 | Resolved: 2024-11-19

## 2024-11-19 LAB — HBA1C MFR BLD: 5.8 %

## 2024-11-19 RX ORDER — DOXYCYCLINE HYCLATE 100 MG
100 TABLET ORAL 2 TIMES DAILY
Qty: 14 TABLET | Refills: 0 | Status: SHIPPED | OUTPATIENT
Start: 2024-11-19 | End: 2024-11-26

## 2024-11-19 RX ORDER — PREDNISONE 20 MG/1
20 TABLET ORAL 2 TIMES DAILY
Qty: 10 TABLET | Refills: 0 | Status: SHIPPED | OUTPATIENT
Start: 2024-11-19 | End: 2024-11-24

## 2024-11-19 ASSESSMENT — ENCOUNTER SYMPTOMS
SORE THROAT: 0
ABDOMINAL PAIN: 0
SHORTNESS OF BREATH: 0
COUGH: 1
COLOR CHANGE: 0
CHEST TIGHTNESS: 1
CONSTIPATION: 0
VOMITING: 0
DIARRHEA: 0
RHINORRHEA: 1

## 2024-11-19 NOTE — PROGRESS NOTES
Subjective:      Chief Complaint   Patient presents with    3 Month Follow-Up    Cough     W/ chest congestion        HPI:  Jonah Hays is a 38 y.o. male who presents today for follow up of chronic conditions as listed below.    Has been having URI symptoms for about 10 days.  Has been having chest congestion, drainage, productive cough.  No fevers.  Denies sinus pain/pressure- states his chest felt the way it did when he had PNA.      Has still been doing well with increased zoloft dose.  Had one panic attack since his last appointment, but knows why and was able to remove himself from the situation.       DM:  HbA1c 5.8.  Is still working on lifestyle modifications.     BP's have been normal.      States he missed his cardiology appointment and has not rescheduled.     No acute concerns today.  Did not get labs completed.        Past Medical History:   Diagnosis Date    Anxiety     \"About Surgery\"    Asthma     \"As a child, no problems since then\"    Chronic back pain     Foot callus     \"Bilateral\"    Headache(784.0)     \"Use to have intense headaches\"    History of nuclear stress test 01/09/2023    Low EF 32 %Medium sized defect of moderate severity which is reversible involving anterolateral wall of myocardium    History of stress test 01/09/2023    Medium sized defect of moderate severity which is reversible involving anterolateral wall of myocardium.  EF 32 %    Hx of echocardiogram 01/09/2023    EF is estimated at 30-35%. Severly enlarged right side of the heart.    Hx of echocardiogram 01/09/2023    EF is estimated at 30-35%Severly enlarged right side of the heart.Grade II diastolic dysfunction.    Hx of echocardiogram 10/25/2023    Compared to previous echo on 4/2023, EF has improved from 35% to 50%.No regional wall motion abnormalities were detected.No significant valvular disease noted.    Meningitis spinal 1987 or 1988    Phobia     \"Phobia Of Needles\"    Shortness of breath on exertion     VL DUP

## 2024-12-11 RX ORDER — METOPROLOL SUCCINATE 25 MG/1
25 TABLET, EXTENDED RELEASE ORAL DAILY
Qty: 30 TABLET | Refills: 5 | OUTPATIENT
Start: 2024-12-11

## 2024-12-19 RX ORDER — SPIRONOLACTONE 25 MG/1
25 TABLET ORAL DAILY
Qty: 90 TABLET | Refills: 5 | OUTPATIENT
Start: 2024-12-19

## 2025-02-20 RX ORDER — METOPROLOL SUCCINATE 25 MG/1
25 TABLET, EXTENDED RELEASE ORAL DAILY
Qty: 30 TABLET | Refills: 5 | OUTPATIENT
Start: 2025-02-20

## 2025-02-20 RX ORDER — ATORVASTATIN CALCIUM 20 MG/1
20 TABLET, FILM COATED ORAL DAILY
Qty: 90 TABLET | Refills: 3 | OUTPATIENT
Start: 2025-02-20

## 2025-04-01 RX ORDER — FUROSEMIDE 40 MG/1
40 TABLET ORAL
Qty: 40 TABLET | Refills: 0 | Status: SHIPPED | OUTPATIENT
Start: 2025-04-02

## 2025-04-01 RX ORDER — SPIRONOLACTONE 25 MG/1
25 TABLET ORAL DAILY
Qty: 90 TABLET | Refills: 0 | Status: SHIPPED | OUTPATIENT
Start: 2025-04-01

## 2025-04-01 RX ORDER — METOPROLOL SUCCINATE 25 MG/1
25 TABLET, EXTENDED RELEASE ORAL DAILY
Qty: 90 TABLET | Refills: 0 | Status: SHIPPED | OUTPATIENT
Start: 2025-04-01

## 2025-04-01 RX ORDER — ATORVASTATIN CALCIUM 20 MG/1
20 TABLET, FILM COATED ORAL DAILY
Qty: 90 TABLET | Refills: 0 | Status: SHIPPED | OUTPATIENT
Start: 2025-04-01

## 2025-04-01 RX ORDER — SACUBITRIL AND VALSARTAN 49; 51 MG/1; MG/1
1 TABLET, FILM COATED ORAL 2 TIMES DAILY
Qty: 180 TABLET | Refills: 0 | Status: SHIPPED | OUTPATIENT
Start: 2025-04-01

## 2025-04-03 DIAGNOSIS — J45.40 MODERATE PERSISTENT ASTHMA WITHOUT COMPLICATION: ICD-10-CM

## 2025-04-03 RX ORDER — FLUTICASONE FUROATE, UMECLIDINIUM BROMIDE AND VILANTEROL TRIFENATATE 100; 62.5; 25 UG/1; UG/1; UG/1
POWDER RESPIRATORY (INHALATION)
Qty: 60 EACH | Refills: 5 | OUTPATIENT
Start: 2025-04-03

## 2025-04-04 DIAGNOSIS — J45.40 MODERATE PERSISTENT ASTHMA WITHOUT COMPLICATION: ICD-10-CM

## 2025-04-04 RX ORDER — ALBUTEROL SULFATE 90 UG/1
2 INHALANT RESPIRATORY (INHALATION) EVERY 4 HOURS PRN
Qty: 36 G | Refills: 5 | Status: SHIPPED | OUTPATIENT
Start: 2025-04-04

## 2025-04-09 ENCOUNTER — OFFICE VISIT (OUTPATIENT)
Dept: CARDIOLOGY CLINIC | Age: 40
End: 2025-04-09
Payer: COMMERCIAL

## 2025-04-09 VITALS — SYSTOLIC BLOOD PRESSURE: 110 MMHG | DIASTOLIC BLOOD PRESSURE: 70 MMHG | HEART RATE: 81 BPM

## 2025-04-09 DIAGNOSIS — R60.0 EDEMA OF LOWER EXTREMITY: ICD-10-CM

## 2025-04-09 DIAGNOSIS — I50.22 CHRONIC SYSTOLIC CONGESTIVE HEART FAILURE (HCC): ICD-10-CM

## 2025-04-09 DIAGNOSIS — I10 ESSENTIAL HYPERTENSION: Primary | ICD-10-CM

## 2025-04-09 DIAGNOSIS — R06.02 SHORTNESS OF BREATH: ICD-10-CM

## 2025-04-09 DIAGNOSIS — I50.22 CHRONIC SYSTOLIC HEART FAILURE (HCC): ICD-10-CM

## 2025-04-09 PROCEDURE — 93000 ELECTROCARDIOGRAM COMPLETE: CPT | Performed by: INTERNAL MEDICINE

## 2025-04-09 PROCEDURE — 3078F DIAST BP <80 MM HG: CPT | Performed by: INTERNAL MEDICINE

## 2025-04-09 PROCEDURE — 3074F SYST BP LT 130 MM HG: CPT | Performed by: INTERNAL MEDICINE

## 2025-04-09 PROCEDURE — 99214 OFFICE O/P EST MOD 30 MIN: CPT | Performed by: INTERNAL MEDICINE

## 2025-04-09 NOTE — PATIENT INSTRUCTIONS
Thank you for allowing us to care for you today!   We want to ensure we can follow your treatment plan and we strive to give you the best outcomes and experience possible.   If you ever have a life threatening emergency and call 911 - for an ambulance (EMS)  REMEMBER  Our providers can only care for you at:   [facility] or [facility]   Even if you have someone take you or you drive yourself we can only care for you in a [facility] facility. Our providers are not setup at the other healthcare locations!    PLEASE CALL OUR OFFICE DURING NORMAL BUSINESS HOURS  Monday through Friday 8 am to 5 pm  AFTER HOURS the physician on-call cannot help with scheduling, rescheduling, procedure instruction questions or any type of medication need or issue.  [facility] P:[phone] - [facility] P:[phone] - [facility] P:[phone]      If you receive a survey:  We would appreciate you taking the time to share your experience concerning your provider visit in the office.    These surveys are confidential!  We are eager to improve and are counting on you to share your feedback so we can ensure you get the best care possible.

## 2025-04-09 NOTE — PROGRESS NOTES
CLINICAL STAFF DOCUMENTATION    Dr. Orville Hays  1985  [id number]    Have you had any Chest Pain recently? - No    Have you had any Shortness of Breath - Yes it is getting better      Have you had any dizziness - No    Have you had any palpitations recently? - No  Any thyroid issues? - No    Do you have any edema - swelling in No         Is the patient on any of the following medications -   If Yes DO EKG - Needs done every 3 months    When did you have your last labs drawn no    Do you need any prescriptions refilled? - No    Do you have a surgery or procedure scheduled in the near future - No  Do use tobacco products? - Yes ON pouches   Do you drink alcohol? - No  Do you use any illicit drugs? - Yes weed  Caffeine? - Yes  How much caffeine? .1  cups       
catheterization performed on 1/3/2023.  Severe nonischemic cardiomyopathy with dilated RV and elevated LVEDP PA RA pressures noted.    VQ scan to rule out chronic thromboembolic disease : Low probability for PE  Continue with aggressive guideline directed medical therapy    Continue with Toprol-XL 25 mg daily  Continue with Aldactone 25 daily  Continue with Entresto, cont with 49/51 mg p.o. twice daily   Will stop Lasix  Continue with Jardiance 10 mg daily  Cont Lipitor 20 daily     Low-salt diet  Follow-up echocardiogram was performed on 10/25/2023 which showed now recovered LV ejection fraction around 50 to 55%     Obtain follow-up echocardiogram 1 week prior to next visit    Diabetes mellitus: Patient is on antidiabetic medications.  Management primary team    Obstructive sleep apnea: Somewhat noncompliant with CPAP due to mask issues.  Patient was encouraged to follow-up with pulmonary medicine       Morbid obesity with BMI of 38.13.  Low-salt low-fat diet and exercise as tolerated patient is following up with weight loss clinic already.      CVI : s/p ablation with Dr. Schaeffer     Diabetes mellitus: On metformin.  On  Jardiance    Hyperlipidemia: Cholesterol levels well controlled.  Total cholesterol 96 HDL 27 LDL 55.  On low-dose statins given history of diabetes mellitus, and otherwise high risk profile    Former smoker      Return in about 6 months (around 10/9/2025).          Counseled extensively and medication compliance urged.  We discussed that for the  prevention of ASCVD our  goal is aggressive risk modification.Patient is encouraged to exercise even a brisk walk for 30 minutes  at least 3 to 4 times a week   Various goals were discussed and questions answered. Continue current medications. Appropriate prescriptions are addressed and refills ordered.  Questions answered and patient verbalizes understanding.  Call for any problems, questions, or concerns.

## 2025-05-12 DIAGNOSIS — E11.9 TYPE 2 DIABETES MELLITUS WITHOUT COMPLICATION, WITHOUT LONG-TERM CURRENT USE OF INSULIN (HCC): ICD-10-CM

## 2025-05-12 RX ORDER — LANCETS 28 GAUGE
EACH MISCELLANEOUS
Qty: 100 EACH | Refills: 1 | Status: SHIPPED | OUTPATIENT
Start: 2025-05-12

## 2025-05-12 RX ORDER — BLOOD SUGAR DIAGNOSTIC
STRIP MISCELLANEOUS
Qty: 100 STRIP | Refills: 1 | Status: SHIPPED | OUTPATIENT
Start: 2025-05-12

## 2025-05-13 ENCOUNTER — OFFICE VISIT (OUTPATIENT)
Dept: INTERNAL MEDICINE CLINIC | Age: 40
End: 2025-05-13
Payer: COMMERCIAL

## 2025-05-13 VITALS
DIASTOLIC BLOOD PRESSURE: 78 MMHG | HEART RATE: 67 BPM | OXYGEN SATURATION: 94 % | SYSTOLIC BLOOD PRESSURE: 110 MMHG | BODY MASS INDEX: 37.73 KG/M2 | WEIGHT: 294 LBS

## 2025-05-13 DIAGNOSIS — F41.9 ANXIETY: ICD-10-CM

## 2025-05-13 DIAGNOSIS — E11.9 TYPE 2 DIABETES MELLITUS WITHOUT COMPLICATION, WITHOUT LONG-TERM CURRENT USE OF INSULIN (HCC): Primary | ICD-10-CM

## 2025-05-13 DIAGNOSIS — J45.909 ASTHMA, UNSPECIFIED ASTHMA SEVERITY, UNSPECIFIED WHETHER COMPLICATED, UNSPECIFIED WHETHER PERSISTENT: ICD-10-CM

## 2025-05-13 DIAGNOSIS — I10 ESSENTIAL HYPERTENSION: ICD-10-CM

## 2025-05-13 DIAGNOSIS — E78.5 HYPERLIPIDEMIA, UNSPECIFIED HYPERLIPIDEMIA TYPE: ICD-10-CM

## 2025-05-13 LAB — HBA1C MFR BLD: 5.2 %

## 2025-05-13 PROCEDURE — 3078F DIAST BP <80 MM HG: CPT | Performed by: FAMILY MEDICINE

## 2025-05-13 PROCEDURE — G2211 COMPLEX E/M VISIT ADD ON: HCPCS | Performed by: FAMILY MEDICINE

## 2025-05-13 PROCEDURE — 3044F HG A1C LEVEL LT 7.0%: CPT | Performed by: FAMILY MEDICINE

## 2025-05-13 PROCEDURE — 83036 HEMOGLOBIN GLYCOSYLATED A1C: CPT | Performed by: FAMILY MEDICINE

## 2025-05-13 PROCEDURE — 3074F SYST BP LT 130 MM HG: CPT | Performed by: FAMILY MEDICINE

## 2025-05-13 PROCEDURE — 99214 OFFICE O/P EST MOD 30 MIN: CPT | Performed by: FAMILY MEDICINE

## 2025-05-13 SDOH — ECONOMIC STABILITY: FOOD INSECURITY: WITHIN THE PAST 12 MONTHS, YOU WORRIED THAT YOUR FOOD WOULD RUN OUT BEFORE YOU GOT MONEY TO BUY MORE.: NEVER TRUE

## 2025-05-13 SDOH — ECONOMIC STABILITY: FOOD INSECURITY: WITHIN THE PAST 12 MONTHS, THE FOOD YOU BOUGHT JUST DIDN'T LAST AND YOU DIDN'T HAVE MONEY TO GET MORE.: NEVER TRUE

## 2025-05-13 ASSESSMENT — PATIENT HEALTH QUESTIONNAIRE - PHQ9
SUM OF ALL RESPONSES TO PHQ QUESTIONS 1-9: 0
2. FEELING DOWN, DEPRESSED OR HOPELESS: NOT AT ALL
1. LITTLE INTEREST OR PLEASURE IN DOING THINGS: NOT AT ALL

## 2025-05-13 NOTE — PROGRESS NOTES
and after visit summary were provided.  Return in about 8 months (around 1/13/2026). or earlier as needed.      Silvia Urbano MD

## 2025-05-14 ASSESSMENT — ENCOUNTER SYMPTOMS
SORE THROAT: 0
CONSTIPATION: 0
DIARRHEA: 0
ABDOMINAL PAIN: 0
VOMITING: 0
CHEST TIGHTNESS: 0
COLOR CHANGE: 0
SHORTNESS OF BREATH: 0

## 2025-07-30 ENCOUNTER — OFFICE VISIT (OUTPATIENT)
Dept: PULMONOLOGY | Age: 40
End: 2025-07-30
Payer: COMMERCIAL

## 2025-07-30 VITALS
SYSTOLIC BLOOD PRESSURE: 130 MMHG | WEIGHT: 297 LBS | HEIGHT: 74 IN | OXYGEN SATURATION: 96 % | DIASTOLIC BLOOD PRESSURE: 78 MMHG | HEART RATE: 86 BPM | BODY MASS INDEX: 38.12 KG/M2

## 2025-07-30 DIAGNOSIS — G47.33 OBSTRUCTIVE SLEEP APNEA: ICD-10-CM

## 2025-07-30 DIAGNOSIS — J45.40 MODERATE PERSISTENT ASTHMA WITHOUT COMPLICATION: Primary | ICD-10-CM

## 2025-07-30 PROCEDURE — 3075F SYST BP GE 130 - 139MM HG: CPT | Performed by: NURSE PRACTITIONER

## 2025-07-30 PROCEDURE — 99214 OFFICE O/P EST MOD 30 MIN: CPT | Performed by: NURSE PRACTITIONER

## 2025-07-30 PROCEDURE — 3078F DIAST BP <80 MM HG: CPT | Performed by: NURSE PRACTITIONER

## 2025-07-30 RX ORDER — ALBUTEROL SULFATE 90 UG/1
2 INHALANT RESPIRATORY (INHALATION) EVERY 6 HOURS PRN
Qty: 1 EACH | Refills: 5 | Status: SHIPPED | OUTPATIENT
Start: 2025-07-30

## 2025-07-30 RX ORDER — FLUTICASONE FUROATE, UMECLIDINIUM BROMIDE AND VILANTEROL TRIFENATATE 100; 62.5; 25 UG/1; UG/1; UG/1
1 POWDER RESPIRATORY (INHALATION) DAILY
Qty: 1 EACH | Refills: 11 | Status: SHIPPED | OUTPATIENT
Start: 2025-07-30

## 2025-07-30 ASSESSMENT — SLEEP AND FATIGUE QUESTIONNAIRES
HOW LIKELY ARE YOU TO NOD OFF OR FALL ASLEEP WHILE WATCHING TV: MODERATE CHANCE OF DOZING
HOW LIKELY ARE YOU TO NOD OFF OR FALL ASLEEP IN A CAR, WHILE STOPPED FOR A FEW MINUTES IN TRAFFIC: WOULD NEVER DOZE
HOW LIKELY ARE YOU TO NOD OFF OR FALL ASLEEP WHILE LYING DOWN TO REST IN THE AFTERNOON WHEN CIRCUMSTANCES PERMIT: HIGH CHANCE OF DOZING
HOW LIKELY ARE YOU TO NOD OFF OR FALL ASLEEP WHEN YOU ARE A PASSENGER IN A CAR FOR AN HOUR WITHOUT A BREAK: WOULD NEVER DOZE
ESS TOTAL SCORE: 5
HOW LIKELY ARE YOU TO NOD OFF OR FALL ASLEEP WHILE SITTING AND READING: WOULD NEVER DOZE
HOW LIKELY ARE YOU TO NOD OFF OR FALL ASLEEP WHILE SITTING AND TALKING TO SOMEONE: WOULD NEVER DOZE
HOW LIKELY ARE YOU TO NOD OFF OR FALL ASLEEP WHILE SITTING QUIETLY AFTER LUNCH WITHOUT ALCOHOL: WOULD NEVER DOZE
HOW LIKELY ARE YOU TO NOD OFF OR FALL ASLEEP WHILE SITTING INACTIVE IN A PUBLIC PLACE: WOULD NEVER DOZE

## 2025-07-30 NOTE — PROGRESS NOTES
Jonah Hays (:  1985) is a 39 y.o. male,Established patient, here for evaluation of the following chief complaint(s):  Follow-up (Inhaler refills) and Medication Check    Subjective   SUBJECTIVE/OBJECTIVE:  Jonah Hays 40 yo established male presents to pulmonary clinic for follow up on sleep apnea compliance, asthma management, and refills. Jonah states that he continues struggling with his CPAP mask. He is noncompliant with use. Today he states that he \"does not like wearing the PAP device.\" He endorses that he has diligently tried using it. He states that he feels like he no longer has any problems with sleep apnea since losing weight.  He wakes up feeling good. He has no fatigue during the day and is feeling pretty energetic.      He reports asthma being well controlled using Trelegy 100 mcg. He feels like a new person being able to breathe. He uses Albuterol on days when the weather is moist, damp, rainy or when there is pollen floating through the air.      Breath sounds are clear.     Has not had any ED visit or inpatient stay for pneumonia or any other respiratory infections since 2024.   He continues using Trelegy 100 mcg and states that ir is working well managing.     Review of Systems   All other systems reviewed and are negative.       Objective   Physical Exam  Vitals and nursing note reviewed.   Constitutional:       General: He is awake.      Appearance: Normal appearance. He is well-developed and well-groomed.   HENT:      Mouth/Throat:      Mouth: Mucous membranes are moist.      Pharynx: Oropharynx is clear.   Eyes:      Extraocular Movements: Extraocular movements intact.      Conjunctiva/sclera: Conjunctivae normal.      Pupils: Pupils are equal, round, and reactive to light.   Cardiovascular:      Rate and Rhythm: Normal rate and regular rhythm.      Pulses: Normal pulses.      Heart sounds: Normal heart sounds.   Pulmonary:      Effort: Pulmonary effort is normal.      Breath

## 2025-08-02 NOTE — ASSESSMENT & PLAN NOTE
Patient reports having trouble tolerating his CPAP device.    Advised resuming use of CPAP.    Discussed risks of untreated sleep apnea.

## 2025-08-14 RX ORDER — ATORVASTATIN CALCIUM 20 MG/1
20 TABLET, FILM COATED ORAL DAILY
Qty: 90 TABLET | Refills: 1 | Status: SHIPPED | OUTPATIENT
Start: 2025-08-14

## 2025-08-28 ENCOUNTER — HOSPITAL ENCOUNTER (EMERGENCY)
Age: 40
Discharge: HOME | End: 2025-08-28
Payer: COMMERCIAL

## 2025-08-28 VITALS
HEART RATE: 76 BPM | TEMPERATURE: 97.9 F | SYSTOLIC BLOOD PRESSURE: 177 MMHG | OXYGEN SATURATION: 97 % | DIASTOLIC BLOOD PRESSURE: 106 MMHG | RESPIRATION RATE: 16 BRPM

## 2025-08-28 DIAGNOSIS — L29.9 ITCHY SKIN: Primary | ICD-10-CM

## 2025-08-28 DIAGNOSIS — R21 RASH: ICD-10-CM

## 2025-08-28 PROCEDURE — 6370000000 HC RX 637 (ALT 250 FOR IP)

## 2025-08-28 RX ORDER — HYDROXYZINE PAMOATE 25 MG/1
25 CAPSULE ORAL ONCE
Status: COMPLETED | OUTPATIENT
Start: 2025-08-28 | End: 2025-08-28

## 2025-08-28 RX ORDER — HYDROXYZINE HYDROCHLORIDE 25 MG/1
25 TABLET, FILM COATED ORAL EVERY 8 HOURS PRN
Qty: 30 TABLET | Refills: 0 | Status: SHIPPED | OUTPATIENT
Start: 2025-08-28 | End: 2025-09-07

## 2025-08-28 RX ORDER — METOPROLOL SUCCINATE 25 MG/1
25 TABLET, EXTENDED RELEASE ORAL DAILY
Qty: 90 TABLET | Refills: 1 | Status: SHIPPED | OUTPATIENT
Start: 2025-08-28

## 2025-08-28 RX ADMIN — HYDROXYZINE PAMOATE 25 MG: 25 CAPSULE ORAL at 17:23

## 2025-08-28 ASSESSMENT — PAIN SCALES - GENERAL
PAINLEVEL_OUTOF10: 0
PAINLEVEL_OUTOF10: 0